# Patient Record
Sex: MALE | Race: WHITE | NOT HISPANIC OR LATINO | Employment: PART TIME | URBAN - METROPOLITAN AREA
[De-identification: names, ages, dates, MRNs, and addresses within clinical notes are randomized per-mention and may not be internally consistent; named-entity substitution may affect disease eponyms.]

---

## 2017-04-13 DIAGNOSIS — L40.50 PSORIATIC ARTHROPATHY (H): Primary | ICD-10-CM

## 2017-04-13 LAB
ERYTHROCYTE [DISTWIDTH] IN BLOOD BY AUTOMATED COUNT: 15.6 % (ref 10–15)
HCT VFR BLD AUTO: 41.4 % (ref 40–53)
HGB BLD-MCNC: 13.9 G/DL (ref 13.3–17.7)
MCH RBC QN AUTO: 29.7 PG (ref 26.5–33)
MCHC RBC AUTO-ENTMCNC: 33.6 G/DL (ref 31.5–36.5)
MCV RBC AUTO: 89 FL (ref 78–100)
PLATELET # BLD AUTO: 368 10E9/L (ref 150–450)
RBC # BLD AUTO: 4.68 10E12/L (ref 4.4–5.9)
WBC # BLD AUTO: 8.7 10E9/L (ref 4–11)

## 2017-04-13 PROCEDURE — 84450 TRANSFERASE (AST) (SGOT): CPT

## 2017-04-13 PROCEDURE — 84460 ALANINE AMINO (ALT) (SGPT): CPT

## 2017-04-13 PROCEDURE — 36415 COLL VENOUS BLD VENIPUNCTURE: CPT

## 2017-04-13 PROCEDURE — 82565 ASSAY OF CREATININE: CPT

## 2017-04-13 PROCEDURE — 85027 COMPLETE CBC AUTOMATED: CPT

## 2017-04-14 LAB
ALT SERPL W P-5'-P-CCNC: 24 U/L (ref 0–70)
AST SERPL W P-5'-P-CCNC: 9 U/L (ref 0–45)
CREAT SERPL-MCNC: 0.95 MG/DL (ref 0.66–1.25)
GFR SERPL CREATININE-BSD FRML MDRD: 81 ML/MIN/1.7M2

## 2017-06-30 DIAGNOSIS — T88.7XXA DRUG SIDE EFFECTS: Primary | ICD-10-CM

## 2017-06-30 LAB
ALT SERPL W P-5'-P-CCNC: 28 U/L (ref 0–70)
AST SERPL W P-5'-P-CCNC: 16 U/L (ref 0–45)
CREAT SERPL-MCNC: 0.78 MG/DL (ref 0.66–1.25)
GFR SERPL CREATININE-BSD FRML MDRD: NORMAL ML/MIN/1.7M2

## 2017-06-30 PROCEDURE — 82565 ASSAY OF CREATININE: CPT | Performed by: INTERNAL MEDICINE

## 2017-06-30 PROCEDURE — 84460 ALANINE AMINO (ALT) (SGPT): CPT | Performed by: INTERNAL MEDICINE

## 2017-06-30 PROCEDURE — 84450 TRANSFERASE (AST) (SGOT): CPT | Performed by: INTERNAL MEDICINE

## 2017-06-30 PROCEDURE — 36415 COLL VENOUS BLD VENIPUNCTURE: CPT | Performed by: INTERNAL MEDICINE

## 2017-07-07 DIAGNOSIS — T88.7XXA DRUG SIDE EFFECTS: ICD-10-CM

## 2017-07-07 LAB
ERYTHROCYTE [DISTWIDTH] IN BLOOD BY AUTOMATED COUNT: 13.9 % (ref 10–15)
HCT VFR BLD AUTO: 43.1 % (ref 40–53)
HGB BLD-MCNC: 14.5 G/DL (ref 13.3–17.7)
MCH RBC QN AUTO: 30.3 PG (ref 26.5–33)
MCHC RBC AUTO-ENTMCNC: 33.6 G/DL (ref 31.5–36.5)
MCV RBC AUTO: 90 FL (ref 78–100)
PLATELET # BLD AUTO: 297 10E9/L (ref 150–450)
RBC # BLD AUTO: 4.78 10E12/L (ref 4.4–5.9)
WBC # BLD AUTO: 6.5 10E9/L (ref 4–11)

## 2017-07-07 PROCEDURE — 85027 COMPLETE CBC AUTOMATED: CPT

## 2017-07-07 PROCEDURE — 36415 COLL VENOUS BLD VENIPUNCTURE: CPT

## 2017-12-01 DIAGNOSIS — T88.7XXA DRUG SIDE EFFECTS: ICD-10-CM

## 2017-12-01 LAB
ALT SERPL W P-5'-P-CCNC: 25 U/L (ref 0–70)
AST SERPL W P-5'-P-CCNC: 16 U/L (ref 0–45)
CREAT SERPL-MCNC: 0.84 MG/DL (ref 0.66–1.25)
ERYTHROCYTE [DISTWIDTH] IN BLOOD BY AUTOMATED COUNT: 13.4 % (ref 10–15)
GFR SERPL CREATININE-BSD FRML MDRD: >90 ML/MIN/1.7M2
HCT VFR BLD AUTO: 43.2 % (ref 40–53)
HGB BLD-MCNC: 14.2 G/DL (ref 13.3–17.7)
MCH RBC QN AUTO: 28.9 PG (ref 26.5–33)
MCHC RBC AUTO-ENTMCNC: 32.9 G/DL (ref 31.5–36.5)
MCV RBC AUTO: 88 FL (ref 78–100)
PLATELET # BLD AUTO: 330 10E9/L (ref 150–450)
RBC # BLD AUTO: 4.92 10E12/L (ref 4.4–5.9)
WBC # BLD AUTO: 7.3 10E9/L (ref 4–11)

## 2017-12-01 PROCEDURE — 85027 COMPLETE CBC AUTOMATED: CPT

## 2017-12-01 PROCEDURE — 84450 TRANSFERASE (AST) (SGOT): CPT

## 2017-12-01 PROCEDURE — 84460 ALANINE AMINO (ALT) (SGPT): CPT

## 2017-12-01 PROCEDURE — 82565 ASSAY OF CREATININE: CPT

## 2017-12-01 PROCEDURE — 36415 COLL VENOUS BLD VENIPUNCTURE: CPT

## 2018-02-01 ENCOUNTER — TRANSFERRED RECORDS (OUTPATIENT)
Dept: HEALTH INFORMATION MANAGEMENT | Facility: CLINIC | Age: 63
End: 2018-02-01

## 2018-02-01 LAB
ALT SERPL-CCNC: 25 IU/L (ref 5–40)
AST SERPL-CCNC: 19 U/L (ref 5–34)
CREAT SERPL-MCNC: 0.77 MG/DL (ref 0.5–1.3)
GFR SERPL CREATININE-BSD FRML MDRD: 109.5 ML/MIN/1.73M2

## 2018-02-05 ENCOUNTER — TELEPHONE (OUTPATIENT)
Dept: GENERAL RADIOLOGY | Facility: CLINIC | Age: 63
End: 2018-02-05

## 2018-02-05 DIAGNOSIS — R52 PAIN: ICD-10-CM

## 2018-02-05 PROCEDURE — 71046 X-RAY EXAM CHEST 2 VIEWS: CPT

## 2018-02-05 NOTE — TELEPHONE ENCOUNTER
2/5/2018- outside order (2 view Chest X-ray) from  Jewel Dickson D.O.  with [facility].    2/5/2018- Order complete

## 2018-09-04 ENCOUNTER — OFFICE VISIT (OUTPATIENT)
Dept: FAMILY MEDICINE | Facility: CLINIC | Age: 63
End: 2018-09-04
Payer: COMMERCIAL

## 2018-09-04 VITALS
DIASTOLIC BLOOD PRESSURE: 75 MMHG | TEMPERATURE: 97.9 F | BODY MASS INDEX: 28.49 KG/M2 | WEIGHT: 181.5 LBS | RESPIRATION RATE: 14 BRPM | OXYGEN SATURATION: 99 % | HEART RATE: 64 BPM | SYSTOLIC BLOOD PRESSURE: 115 MMHG | HEIGHT: 67 IN

## 2018-09-04 DIAGNOSIS — E78.5 HYPERLIPIDEMIA LDL GOAL <160: ICD-10-CM

## 2018-09-04 DIAGNOSIS — E66.811 CLASS 1 OBESITY DUE TO EXCESS CALORIES WITH SERIOUS COMORBIDITY AND BODY MASS INDEX (BMI) OF 31.0 TO 31.9 IN ADULT: ICD-10-CM

## 2018-09-04 DIAGNOSIS — L40.50 PSORIATIC ARTHRITIS (H): ICD-10-CM

## 2018-09-04 DIAGNOSIS — Z23 NEED FOR ZOSTER VACCINATION: ICD-10-CM

## 2018-09-04 DIAGNOSIS — Z00.00 ROUTINE GENERAL MEDICAL EXAMINATION AT A HEALTH CARE FACILITY: Primary | ICD-10-CM

## 2018-09-04 DIAGNOSIS — E66.01 MORBID OBESITY (H): ICD-10-CM

## 2018-09-04 DIAGNOSIS — Z12.5 SCREENING FOR PROSTATE CANCER: ICD-10-CM

## 2018-09-04 DIAGNOSIS — E66.09 CLASS 1 OBESITY DUE TO EXCESS CALORIES WITH SERIOUS COMORBIDITY AND BODY MASS INDEX (BMI) OF 31.0 TO 31.9 IN ADULT: ICD-10-CM

## 2018-09-04 DIAGNOSIS — L40.9 PSORIASIS: ICD-10-CM

## 2018-09-04 DIAGNOSIS — Z23 NEED FOR TDAP VACCINATION: ICD-10-CM

## 2018-09-04 PROCEDURE — 36415 COLL VENOUS BLD VENIPUNCTURE: CPT | Performed by: FAMILY MEDICINE

## 2018-09-04 PROCEDURE — G0103 PSA SCREENING: HCPCS | Performed by: FAMILY MEDICINE

## 2018-09-04 PROCEDURE — 99396 PREV VISIT EST AGE 40-64: CPT | Mod: 25 | Performed by: FAMILY MEDICINE

## 2018-09-04 PROCEDURE — 90715 TDAP VACCINE 7 YRS/> IM: CPT | Performed by: FAMILY MEDICINE

## 2018-09-04 PROCEDURE — 90750 HZV VACC RECOMBINANT IM: CPT | Performed by: FAMILY MEDICINE

## 2018-09-04 PROCEDURE — 80061 LIPID PANEL: CPT | Performed by: FAMILY MEDICINE

## 2018-09-04 PROCEDURE — 90471 IMMUNIZATION ADMIN: CPT | Performed by: FAMILY MEDICINE

## 2018-09-04 PROCEDURE — 90472 IMMUNIZATION ADMIN EACH ADD: CPT | Performed by: FAMILY MEDICINE

## 2018-09-04 PROCEDURE — 80053 COMPREHEN METABOLIC PANEL: CPT | Performed by: FAMILY MEDICINE

## 2018-09-04 RX ORDER — CALCITRIOL 3 UG/G
3 OINTMENT TOPICAL PRN
Qty: 45 G | Refills: 3 | Status: SHIPPED | OUTPATIENT
Start: 2018-09-04 | End: 2022-12-30

## 2018-09-04 RX ORDER — CLOBETASOL PROPIONATE 0.5 MG/ML
SOLUTION TOPICAL PRN
Qty: 50 ML | Refills: 3 | Status: SHIPPED | OUTPATIENT
Start: 2018-09-04 | End: 2022-12-30

## 2018-09-04 NOTE — MR AVS SNAPSHOT
After Visit Summary   9/4/2018    Artie Carpenter    MRN: [id number]           Patient Information     Date Of Birth          1955        Visit Information        Provider Department      9/4/2018 10:30 AM Sulaiman Delgado MD [facility]        Today's Diagnoses     Routine general medical examination at a health care facility    -  1    Hyperlipidemia LDL goal <160        Psoriatic arthritis (H)        Morbid obesity (H)        Class 1 obesity due to excess calories with serious comorbidity and body mass index (BMI) of 31.0 to 31.9 in adult        Screening for prostate cancer          Care Instructions      Preventive Health Recommendations  Male Ages 50 - 64    Yearly exam:             See your health care provider every year in order to  o   Review health changes.   o   Discuss preventive care.    o   Review your medicines if your doctor has prescribed any.     Have a cholesterol test every 5 years, or more frequently if you are at risk for high cholesterol/heart disease.     Have a diabetes test (fasting glucose) every three years. If you are at risk for diabetes, you should have this test more often.     Have a colonoscopy at age 50, or have a yearly FIT test (stool test). These exams will check for colon cancer.      Talk with your health care provider about whether or not a prostate cancer screening test (PSA) is right for you.    You should be tested each year for STDs (sexually transmitted diseases), if you re at risk.     Shots: Get a flu shot each year. Get a tetanus shot every 10 years.     Nutrition:    Eat at least 5 servings of fruits and vegetables daily.     Eat whole-grain bread, whole-wheat pasta and brown rice instead of white grains and rice.     Get adequate Calcium and Vitamin D.     Lifestyle    Exercise for at least 150 minutes a week (30 minutes a day, 5 days a week). This will help you control your weight and prevent disease.     Limit  "alcohol to one drink per day.     No smoking.     Wear sunscreen to prevent skin cancer.     See your dentist every six months for an exam and cleaning.     See your eye doctor every 1 to 2 years.            Follow-ups after your visit        Follow-up notes from your care team     Return in about 6 months (around 3/4/2019) for BP Recheck, Physical Exam, Lab Work.      Who to contact     If you have questions or need follow up information about today's clinic visit or your schedule please contact [place] [facility] directly at [phone].  Normal or non-critical lab and imaging results will be communicated to you by [employer], letter or phone within 4 business days after the clinic has received the results. If you do not hear from us within 7 days, please contact the clinic through [employer] or phone. If you have a critical or abnormal lab result, we will notify you by phone as soon as possible.  Submit refill requests through [employer] or call your pharmacy and they will forward the refill request to us. Please allow 3 business days for your refill to be completed.          Additional Information About Your Visit        MyChart Information     [employer] gives you secure access to your electronic health record. If you see a primary care provider, you can also send messages to your care team and make appointments. If you have questions, please call your primary care clinic.  If you do not have a primary care provider, please call [phone] and they will assist you.        Care EveryWhere ID     This is your Care EveryWhere ID. This could be used by other organizations to access your [facility] medical records  [id number]        Your Vitals Were     Pulse Temperature Respirations Height Pulse Oximetry BMI (Body Mass Index)    64 97.9  F (36.6  C) (Oral) 14 5' 6.5\" (1.689 m) 99% 28.86 kg/m2       Blood Pressure from Last 3 Encounters:   09/04/18 115/75   10/04/16 108/83   08/29/16 136/80    Weight from Last 3 " Encounters:   09/04/18 181 lb 8 oz (82.3 kg)   08/29/16 214 lb (97.1 kg)              We Performed the Following     Comprehensive metabolic panel     Lipid panel reflex to direct LDL Fasting     PSA, screen        Primary Care Provider Office Phone # Fax #    Sulaiman Delgado [phone] [address] [facility] [address]        Equal Access to Services     JUNIOR HERBERT : Hadii aad ku hadasho Soomaali, waaxda luqadaha, qaybta kaalmada adeegyada, waxay idiin hayaan adeeg kharash la'aan ah. So [facility] [phone].    ATENCIÓN: Si habla espman, tiene a nieto disposición servicios gratuitos de asistencia lingüística. Llame al [phone].    We comply with applicable federal civil rights laws and [place] laws. We do not discriminate on the basis of race, color, national origin, age, disability, sex, sexual orientation, or gender identity.            Thank you!     Thank you for choosing [facility]  for your care. Our goal is always to provide you with excellent care. Hearing back from our patients is one way we can continue to improve our services. Please take a few minutes to complete the written survey that you may receive in the mail after your visit with us. Thank you!             Your Updated Medication List - Protect others around you: Learn how to safely use, store and throw away your medicines at www.disposemymeds.org.          This list is accurate as of 9/4/18 10:59 AM.  Always use your most recent med list.                   Brand Name Dispense Instructions for use Diagnosis    * clobetasol 0.05 % ointment    TEMOVATE     Apply topically as needed        * clobetasol 0.05 % external solution    TEMOVATE     Apply topically as needed        VECTICAL 3 MCG/GM Oint   Generic drug:  Calcitriol      Externally apply 3 mcg topically as needed        * Notice:  This list has 2 medication(s) that are the same as other medications prescribed for you. Read the  directions carefully, and ask your doctor or other care provider to review them with you.

## 2018-09-04 NOTE — PROGRESS NOTES
SUBJECTIVE:   CC: Artie Carpenter is an 63 year old male who presents for preventative health visit.     Physical   Annual:     Getting at least 3 servings of Calcium per day:  Yes    Bi-annual eye exam:  NO    Dental care twice a year:  Yes    Sleep apnea or symptoms of sleep apnea:  None    Diet:  Regular (no restrictions)    Frequency of exercise:  2-3 days/week    Duration of exercise:  15-30 minutes    Taking medications regularly:  Yes    Medication side effects:  Not applicable    Additional concerns today:  No            Today's PHQ-2 Score:   PHQ-2 ( 1999 [employer]) 9/4/2018   Q1: Little interest or pleasure in doing things 0   Q2: Feeling down, depressed or hopeless 0   PHQ-2 Score 0   Q1: Little interest or pleasure in doing things Not at all   Q2: Feeling down, depressed or hopeless Not at all   PHQ-2 Score 0       Abuse: Current or Past(Physical, Sexual or Emotional)- No  Do you feel safe in your environment - Yes    Social History   Substance Use Topics     Smoking status: Never Smoker     Smokeless tobacco: Not on file     Alcohol use No     Alcohol Use 8/28/2018   If you drink alcohol do you typically have greater than 3 drinks per day OR greater than 7 drinks per week? Not Applicable       Last PSA: No results found for: PSA    Reviewed orders with patient. Reviewed health maintenance and updated orders accordingly -     He's no longer on immune   Reviewed and updated as needed this visit by clinical staff         Reviewed and updated as needed this visit by Provider            Review of Systems  CONSTITUTIONAL: NEGATIVE for fever, chills, change in weight  INTEGUMENTARY/SKIN: NEGATIVE for worrisome rashes, moles or lesions  EYES: NEGATIVE for vision changes or irritation  ENT: NEGATIVE for ear, mouth and throat problems  RESP: NEGATIVE for significant cough or SOB  CV: NEGATIVE for chest pain, palpitations or peripheral edema  GI: NEGATIVE for nausea, abdominal pain, heartburn, or change in bowel  habits   male: negative for dysuria, hematuria, decreased urinary stream, erectile dysfunction, urethral discharge  MUSCULOSKELETAL: NEGATIVE for significant arthralgias or myalgia  NEURO: NEGATIVE for weakness, dizziness or paresthesias  PSYCHIATRIC: NEGATIVE for changes in mood or affect    OBJECTIVE:   There were no vitals taken for this visit.    Physical Exam  GENERAL: healthy, alert and no distress  EYES: Eyes grossly normal to inspection, PERRL and conjunctivae and sclerae normal  HENT: ear canals and TM's normal, nose and mouth without ulcers or lesions  NECK: no adenopathy, no asymmetry, masses, or scars and thyroid normal to palpation  RESP: lungs clear to auscultation - no rales, rhonchi or wheezes  CV: regular rate and rhythm, normal S1 S2, no S3 or S4, no murmur, click or rub, no peripheral edema and peripheral pulses strong  ABDOMEN: soft, nontender, no hepatosplenomegaly, no masses and bowel sounds normal   (male): normal male genitalia without lesions or urethral discharge, no hernia  MS: no gross musculoskeletal defects noted, no edema  SKIN: no suspicious lesions or rashes  NEURO: Normal strength and tone, mentation intact and speech normal  PSYCH: mentation appears normal, affect normal/bright        ASSESSMENT/PLAN:     (Z00.00) Routine general medical examination at a health care facility  (primary encounter diagnosis)  Comment:   Plan: he's lost over 40 pounds with keto diet    (E78.5) Hyperlipidemia LDL goal <160  Comment:   Plan: Lipid panel reflex to direct LDL Fasting,         Comprehensive metabolic panel            (L40.50) Psoriatic arthritis (H)  Comment:   Plan: off his remicade    (E66.01) Morbid obesity (H)  Comment:   Plan: med review, diet check include josue     (E66.09,  Z68.31) Class 1 obesity due to excess calories with serious comorbidity and body mass index (BMI) of 31.0 to 31.9 in adult  Comment:   Plan:     (Z12.5) Screening for prostate cancer  Comment:   Plan: PSA,  "screen        Brother had prostate ca, his colon was checked two years ago       COUNSELING:   Reviewed preventive health counseling, as reflected in patient instructions       Regular exercise       Healthy diet/nutrition       Vision screening    BP Readings from Last 1 Encounters:   10/04/16 108/83     Estimated body mass index is 33.52 kg/(m^2) as calculated from the following:    Height as of 8/29/16: 5' 7\" (1.702 m).    Weight as of 8/29/16: 214 lb (97.1 kg).      Weight management plan: Discussed healthy diet and exercise guidelines and patient will follow up in 6 months in clinic to re-evaluate.     reports that he has never smoked. He does not have any smokeless tobacco history on file.  No alcohol    He has inflammatory arthritis     Counseling Resources:  ATP IV Guidelines  Pooled Cohorts Equation Calculator  FRAX Risk Assessment  ICSI Preventive Guidelines  Dietary Guidelines for Americans, 2010  USDA's MyPlate  ASA Prophylaxis  Lung CA Screening    Sulaiman Delgado MD  [facility]  Answers for HPI/ROS submitted by the patient on 8/28/2018   PHQ-2 Score: 0    "

## 2018-09-04 NOTE — LETTER
September 5, 2018      Artie Carpenter  [address] [address] [address]        Dear ,    We are writing to inform you of your test results. Blood is good including cholesterol which is at or better than the community average. We should check again in 6 months as you keep with the diet. / Prostate blood test is good also.        Resulted Orders   Lipid panel reflex to direct LDL Fasting   Result Value Ref Range    Cholesterol 207 (H) <200 mg/dL      Comment:      Desirable:       <200 mg/dl    Triglycerides 97 <150 mg/dL      Comment:      Fasting specimen    HDL Cholesterol 39 (L) >39 mg/dL    LDL Cholesterol Calculated 149 (H) <100 mg/dL      Comment:      Above desirable:  100-129 mg/dl  Borderline High:  130-159 mg/dL  High:             160-189 mg/dL  Very high:       >189 mg/dl      Non HDL Cholesterol 168 (H) <130 mg/dL      Comment:      Above Desirable:  130-159 mg/dl  Borderline high:  160-189 mg/dl  High:             190-219 mg/dl  Very high:       >219 mg/dl     Comprehensive metabolic panel   Result Value Ref Range    Sodium 140 133 - 144 mmol/L    Potassium 4.1 3.4 - 5.3 mmol/L    Chloride 107 94 - 109 mmol/L    Carbon Dioxide 25 20 - 32 mmol/L    Anion Gap 8 3 - 14 mmol/L    Glucose 95 70 - 99 mg/dL      Comment:      Fasting specimen    Urea Nitrogen 18 7 - 30 mg/dL    Creatinine 0.81 0.66 - 1.25 mg/dL    GFR Estimate >90 >60 mL/min/1.7m2      Comment:      Non  GFR Calc    GFR Estimate If Black >90 >60 mL/min/1.7m2      Comment:       GFR Calc    Calcium 8.9 8.5 - 10.1 mg/dL    Bilirubin Total 0.4 0.2 - 1.3 mg/dL    Albumin 3.8 3.4 - 5.0 g/dL    Protein Total 7.2 6.8 - 8.8 g/dL    Alkaline Phosphatase 85 40 - 150 U/L    ALT 23 0 - 70 U/L    AST 18 0 - 45 U/L   PSA, screen   Result Value Ref Range    PSA 0.34 0 - 4 ug/L      Comment:      Assay Method:  Chemiluminescence using Siemens Vista analyzer       If you have any questions or concerns,  please call the clinic at the number listed above.       Sincerely,        Sulaiman Delgado MD

## 2018-09-05 ENCOUNTER — MYC MEDICAL ADVICE (OUTPATIENT)
Dept: FAMILY MEDICINE | Facility: CLINIC | Age: 63
End: 2018-09-05

## 2018-09-05 DIAGNOSIS — L40.9 PSORIASIS: ICD-10-CM

## 2018-09-05 DIAGNOSIS — Z11.59 NEED FOR HEPATITIS C SCREENING TEST: ICD-10-CM

## 2018-09-05 DIAGNOSIS — N52.9 ERECTILE DYSFUNCTION, UNSPECIFIED ERECTILE DYSFUNCTION TYPE: Primary | ICD-10-CM

## 2018-09-05 LAB
ALBUMIN SERPL-MCNC: 3.8 G/DL (ref 3.4–5)
ALP SERPL-CCNC: 85 U/L (ref 40–150)
ALT SERPL W P-5'-P-CCNC: 23 U/L (ref 0–70)
ANION GAP SERPL CALCULATED.3IONS-SCNC: 8 MMOL/L (ref 3–14)
AST SERPL W P-5'-P-CCNC: 18 U/L (ref 0–45)
BILIRUB SERPL-MCNC: 0.4 MG/DL (ref 0.2–1.3)
BUN SERPL-MCNC: 18 MG/DL (ref 7–30)
CALCIUM SERPL-MCNC: 8.9 MG/DL (ref 8.5–10.1)
CHLORIDE SERPL-SCNC: 107 MMOL/L (ref 94–109)
CHOLEST SERPL-MCNC: 207 MG/DL
CO2 SERPL-SCNC: 25 MMOL/L (ref 20–32)
CREAT SERPL-MCNC: 0.81 MG/DL (ref 0.66–1.25)
GFR SERPL CREATININE-BSD FRML MDRD: >90 ML/MIN/1.7M2
GLUCOSE SERPL-MCNC: 95 MG/DL (ref 70–99)
HDLC SERPL-MCNC: 39 MG/DL
LDLC SERPL CALC-MCNC: 149 MG/DL
NONHDLC SERPL-MCNC: 168 MG/DL
POTASSIUM SERPL-SCNC: 4.1 MMOL/L (ref 3.4–5.3)
PROT SERPL-MCNC: 7.2 G/DL (ref 6.8–8.8)
PSA SERPL-ACNC: 0.34 UG/L (ref 0–4)
SODIUM SERPL-SCNC: 140 MMOL/L (ref 133–144)
TRIGL SERPL-MCNC: 97 MG/DL

## 2018-09-08 RX ORDER — SILDENAFIL CITRATE 20 MG/1
TABLET ORAL
Qty: 30 TABLET | Refills: 0 | Status: SHIPPED | OUTPATIENT
Start: 2018-09-08 | End: 2022-12-30

## 2018-09-08 NOTE — TELEPHONE ENCOUNTER
Both ointment and soln were sent on 9/4     For the viagra I was waiting on the labs.. Remember that insurance often will not pay for it.     I usually order the 20 mg sildenafil and instruct to use 1 or 2 or 3 before sex. It is still generally much cheaper than the 50 mg sildenafil (viagra) See what you think.

## 2018-09-10 NOTE — TELEPHONE ENCOUNTER
Sent [employer] message, lab confirms will need redraw for hep C, futured order, will monitor pt response  Guadalupe Rabago RN, BSN  Message handled by Nurse Triage.

## 2018-09-10 NOTE — TELEPHONE ENCOUNTER
Discussed with lab again, 72 hr turn around so sample no longer useful, will confirm rx, sent another "[employer]t message    Guadalupe Rabago RN, BSN  Message handled by Nurse Triage.

## 2018-09-11 RX ORDER — CLOBETASOL PROPIONATE 0.5 MG/G
OINTMENT TOPICAL
Qty: 45 G | Refills: 3 | Status: SHIPPED | OUTPATIENT
Start: 2018-09-11 | End: 2022-12-30

## 2018-09-11 NOTE — TELEPHONE ENCOUNTER
Sulaiman Delgado MD, pt asking for third psoriasis medication, see Mychart message, will need to say HOW MUCH APPLY AT A TIME,  Inform pt if problem    I apply it about twice a week, I alternate it with the Calcitriol.  I have patches on my forearms, lower and upper legs and my back.    Guadalupe Rabago RN, BSN  Message handled by Nurse Triage.

## 2018-10-29 ENCOUNTER — ALLIED HEALTH/NURSE VISIT (OUTPATIENT)
Dept: NURSING | Facility: CLINIC | Age: 63
End: 2018-10-29
Payer: COMMERCIAL

## 2018-10-29 DIAGNOSIS — Z11.1 SCREENING EXAMINATION FOR PULMONARY TUBERCULOSIS: Primary | ICD-10-CM

## 2018-10-29 PROCEDURE — 99207 ZZC NO CHARGE NURSE ONLY: CPT

## 2018-10-29 PROCEDURE — 86580 TB INTRADERMAL TEST: CPT

## 2018-10-29 NOTE — MR AVS SNAPSHOT
After Visit Summary   10/29/2018    Artie Carpenter    MRN: [id number]           Patient Information     Date Of Birth          1955        Visit Information        Provider Department      10/29/2018 11:00 AM KRIS BLACKMON/LPN [facility]        Today's Diagnoses     Screening examination for pulmonary tuberculosis    -  1       Follow-ups after your visit        Who to contact     If you have questions or need follow up information about today's clinic visit or your schedule please contact [facility] directly at [phone].  Normal or non-critical lab and imaging results will be communicated to you by [employer], letter or phone within 4 business days after the clinic has received the results. If you do not hear from us within 7 days, please contact the clinic through [employer] or phone. If you have a critical or abnormal lab result, we will notify you by phone as soon as possible.  Submit refill requests through [employer] or call your pharmacy and they will forward the refill request to us. Please allow 3 business days for your refill to be completed.          Additional Information About Your Visit        MyChart Information     [employer] gives you secure access to your electronic health record. If you see a primary care provider, you can also send messages to your care team and make appointments. If you have questions, please call your primary care clinic.  If you do not have a primary care provider, please call [phone] and they will assist you.        Care EveryWhere ID     This is your Care EveryWhere ID. This could be used by other organizations to access your [place] [facility] medical records  [id number]         Blood Pressure from Last 3 Encounters:   09/04/18 115/75   10/04/16 108/83   08/29/16 136/80    Weight from Last 3 Encounters:   09/04/18 181 lb 8 oz (82.3 kg)   08/29/16 214 lb (97.1 kg)              We Performed the Following     TB INTRADERMAL TEST         Primary Care Provider Office Phone # Fax #    Sulaiman Delgado [phone] [address] [facility] [address]        Equal Access to Services     ANETTE LAYALIZA : Hadii cirilo barillas chinyereo Bing, waaxda luqadaha, qaybta kaalmada adejohn, jose j greenmikayla susana. So [facility] [phone].    ATENCIÓN: Si habla español, tiene a nieto disposición servicios gratuitos de asistencia lingüística. Llame al [phone].    We comply with applicable federal civil rights laws and [place] laws. We do not discriminate on the basis of race, color, national origin, age, disability, sex, sexual orientation, or gender identity.            Thank you!     Thank you for choosing [facility]  for your care. Our goal is always to provide you with excellent care. Hearing back from our patients is one way we can continue to improve our services. Please take a few minutes to complete the written survey that you may receive in the mail after your visit with us. Thank you!             Your Updated Medication List - Protect others around you: Learn how to safely use, store and throw away your medicines at www.disposemymeds.org.          This list is accurate as of 10/29/18 11:04 AM.  Always use your most recent med list.                   Brand Name Dispense Instructions for use Diagnosis    Calcitriol 3 MCG/GM Oint    VECTICAL    45 g    Externally apply 3 mcg topically as needed    Psoriasis       * clobetasol 0.05 % external solution    TEMOVATE    50 mL    Apply topically as needed    Psoriasis       * clobetasol 0.05 % ointment    TEMOVATE    45 g    Apply to affected skin   twice weekly prn    Psoriasis       sildenafil 20 MG tablet    REVATIO    30 tablet    Take 1 tablet to 3 tablets one hour before sex,    Erectile dysfunction, unspecified erectile dysfunction type       * Notice:  This list has 2 medication(s) that are the same as other medications prescribed for you.  Read the directions carefully, and ask your doctor or other care provider to review them with you.

## 2018-11-21 ENCOUNTER — ALLIED HEALTH/NURSE VISIT (OUTPATIENT)
Dept: NURSING | Facility: CLINIC | Age: 63
End: 2018-11-21
Payer: COMMERCIAL

## 2018-11-21 DIAGNOSIS — Z23 ENCOUNTER FOR IMMUNIZATION: Primary | ICD-10-CM

## 2018-11-21 PROCEDURE — 99207 ZZC NO CHARGE NURSE ONLY: CPT

## 2018-11-21 PROCEDURE — 90750 HZV VACC RECOMBINANT IM: CPT

## 2018-11-21 PROCEDURE — 90471 IMMUNIZATION ADMIN: CPT

## 2019-06-18 ENCOUNTER — MYC MEDICAL ADVICE (OUTPATIENT)
Dept: FAMILY MEDICINE | Facility: CLINIC | Age: 64
End: 2019-06-18

## 2019-06-18 NOTE — TELEPHONE ENCOUNTER
Discussed with staff, can make nurse only weight/height check, system should update, can confirm problem list after, sent [email or web address] message informing  Guadalupe Rabago RN, BSN  Message handled by Nurse Triage.

## 2019-06-18 NOTE — TELEPHONE ENCOUNTER
Good work for Artie, I don't know how to take citations off the problem list in our new Epic , can someone find out how this is done or even find the problem list tab. Thanks. .

## 2019-06-20 ENCOUNTER — OFFICE VISIT (OUTPATIENT)
Dept: ORTHOPEDICS | Facility: CLINIC | Age: 64
End: 2019-06-20
Payer: COMMERCIAL

## 2019-06-20 VITALS
HEIGHT: 67 IN | SYSTOLIC BLOOD PRESSURE: 112 MMHG | BODY MASS INDEX: 28.41 KG/M2 | WEIGHT: 181 LBS | DIASTOLIC BLOOD PRESSURE: 78 MMHG

## 2019-06-20 DIAGNOSIS — M53.3 SACROILIAC JOINT DYSFUNCTION OF LEFT SIDE: ICD-10-CM

## 2019-06-20 DIAGNOSIS — M54.32 SCIATICA OF LEFT SIDE: Primary | ICD-10-CM

## 2019-06-20 PROCEDURE — 99204 OFFICE O/P NEW MOD 45 MIN: CPT | Performed by: FAMILY MEDICINE

## 2019-06-20 ASSESSMENT — MIFFLIN-ST. JEOR: SCORE: 1571.7

## 2019-06-20 NOTE — PATIENT INSTRUCTIONS
1. Sciatica of left side    2. Sacroiliac joint dysfunction of left side      -Patient has low back pain rating down the left leg likely due to combination of degenerative disc disease and sacroiliac joint dysfunction.  -Patient has had mild temporary relief with chiropractic treatments.  -Patient will get an MRI of the lumbar spine for further evaluation.  Your MRI has been ordered. Will check for same day otherwise, schedule with [place] ([phone]). Once you know the date of your MRI, please call my office and schedule a follow-up visit for 2 days after that.  -Call direct clinic number [[phone]] at any time with questions or concerns.    Albert Yeo MD CAQSM  [facility]  [facility] [place]

## 2019-06-20 NOTE — LETTER
6/20/2019         RE: Artie Carpenter  [address]  [facility] [address]        Dear Colleague,    Thank you for referring your patient, Artie Carpenter, to the [facility]. Please see a copy of my visit note below.    ASSESSMENT & PLAN  Patient Instructions     1. Sciatica of left side    2. Sacroiliac joint dysfunction of left side      -Patient has low back pain rating down the left leg likely due to combination of degenerative disc disease and sacroiliac joint dysfunction.  -Patient has had mild temporary relief with chiropractic treatments.  -Patient will get an MRI of the lumbar spine for further evaluation.  Your MRI has been ordered. Will check for same day otherwise, schedule with [facility] ([phone]). Once you know the date of your MRI, please call my office and schedule a follow-up visit for 2 days after that.  -Call direct clinic number [[phone]] at any time with questions or concerns.    Albert Yeo MD [facility]  [facility]          -----    SUBJECTIVE  Artie Carpenter is a/an 64 year old male who is seen in consultation at the request of  Sulaiman Delgado M.D. for evaluation of low back pain. The patient is seen by themselves.    Onset: 2 years(s) ago. Reports insidious onset without acute precipitating event.  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  Better with: heat, medication, chiropractor provide, temporary relief  Treatments tried: rest/activity avoidance, heat, Tylenol, ibuprofen, home exercises, chiropractic care (6-7 visits), previous imaging (xray March 2019 @ [facility]) and yoga  Quality: aching, radiating to left calf  Red flags: Weakness: No, bowel/bladder  loss: No, foot drop: No  Associated symptoms: no distal numbness or tingling; denies swelling or warmth  Orthopedic history: YES - psoriatic arthritis  Relevant surgical history: NO  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    Past Medical History:   Diagnosis Date     Arthritis      Exogenous obesity      Psoriasis      Psoriatic arthritis (H)      Social History     Socioeconomic History     Marital status:      Spouse name: Not on file     Number of children: Not on file     Years of education: Not on file     Highest education level: Not on file   Occupational History     Not on file   Social Needs     Financial resource strain: Not on file     Food insecurity:     Worry: Not on file     Inability: Not on file     Transportation needs:     Medical: Not on file     Non-medical: Not on file   Tobacco Use     Smoking status: Never Smoker     Smokeless tobacco: Never Used   Substance and Sexual Activity     Alcohol use: No     Alcohol/week: 0.0 oz     Drug use: No     Sexual activity: Not on file   Lifestyle     Physical activity:     Days per week: Not on file     Minutes per session: Not on file     Stress: Not on file   Relationships     Social connections:     Talks on phone: Not on file     Gets together: Not on file     Attends Shinto service: Not on file     Active member of club or organization: Not on file     Attends meetings of clubs or organizations: Not on file     Relationship status: Not on file     Intimate partner violence:     Fear of current or ex partner: Not on file     Emotionally abused: Not on file     Physically abused: Not on file     Forced sexual activity: Not on file   Other Topics Concern     Parent/sibling w/ CABG, MI or angioplasty before 65F 55M? Not Asked   Social History Narrative     Not on file         Patient's past medical, surgical, social, and family histories were reviewed today and no changes are noted.    REVIEW OF  "SYSTEMS:  10 point ROS is negative other than symptoms noted above in HPI, Past Medical History or as stated below  Constitutional: NEGATIVE for fever, chills, change in weight  Skin: NEGATIVE for worrisome rashes, moles or lesions  GI/: NEGATIVE for bowel or bladder changes  Neuro: NEGATIVE for weakness, dizziness or paresthesias    OBJECTIVE:  /78   Ht 1.689 m (5' 6.5\")   Wt 82.1 kg (181 lb)   BMI 28.78 kg/m      General: healthy, alert and in no distress  HEENT: no scleral icterus or conjunctival erythema  Skin: no suspicious lesions or rash. No jaundice.  CV: no pedal edema  Resp: normal respiratory effort without conversational dyspnea   Psych: normal mood and affect  Gait: normal steady gait with appropriate coordination and balance  Neuro: normal light touch sensory exam of the bilateral lower extremities.  DTR's 2+ patella and achilles bilaterally.  MSK:  THORACIC/LUMBAR SPINE  Inspection:    No gross deformity/asymmetry  Palpation:     landmarks are nontender.  Range of Motion:     Lumbar flexion limited slightly by pain    Lumbar extension limited slightly by pain  Strength:    Full strength throughout hips/quads/hamstrings  Special Tests:    Positive: SI joint compression (left), Gaenslen's test    Negative: straight leg raise (bilateral), slump test (bilateral), femoral stretch test (bilateral)    LEFT HIP  Inspection:    No obvious deformity or asymmetry, pelvis level  Palpation:    Tender about the SI joint. Otherwise all other landmarks are nontender.  Active Range of Motion:     Flexion within normal limits, IR within normal limits, ER  within normal limits  Strength:    Flexion 5/5, adduction 5/5, abduction 5/5  Special Tests:    Positive: BRIANA SI provocative testing        Independent visualization of the below image:  No results found for this or any previous visit (from the past 24 hour(s)).          Albert Yeo MD [facility]      Again, thank you for " allowing me to participate in the care of your patient.        Sincerely,        Albert Yeo, MD

## 2019-06-20 NOTE — PROGRESS NOTES
ASSESSMENT & PLAN  Patient Instructions     1. Sciatica of left side    2. Sacroiliac joint dysfunction of left side      -Patient has low back pain rating down the left leg likely due to combination of degenerative disc disease and sacroiliac joint dysfunction.  -Patient has had mild temporary relief with chiropractic treatments.  -Patient will get an MRI of the lumbar spine for further evaluation.  Your MRI has been ordered. Will check for same day otherwise, schedule with [facility] ([phone]). Once you know the date of your MRI, please call my office and schedule a follow-up visit for 2 days after that.  -Call direct clinic number [[phone]] at any time with questions or concerns.    Albert Yeo MD [facility]  [facility]          -----    SUBJECTIVE  Artie Carpenter is a/an 64 year old male who is seen in consultation at the request of  Sulaiman Delgado M.D. for evaluation of low back pain. The patient is seen by themselves.    Onset: 2 years(s) ago. Reports insidious onset without acute precipitating event.  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  Better with: heat, medication, chiropractor provide, temporary relief  Treatments tried: rest/activity avoidance, heat, Tylenol, ibuprofen, home exercises, chiropractic care (6-7 visits), previous imaging (xray March 2019 @ [facility]) and yoga  Quality: aching, radiating to left calf  Red flags: Weakness: No, bowel/bladder loss: No, foot drop: No  Associated symptoms: no distal numbness or tingling; denies swelling or warmth  Orthopedic history: YES - psoriatic arthritis  Relevant surgical history: NO  Social history: social history: 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    Past Medical History:   Diagnosis Date     Arthritis      Exogenous obesity      Psoriasis      Psoriatic arthritis (H)      Social History     Socioeconomic History     Marital status:      Spouse name: Not on file     Number of children: Not on file     Years of education: Not on file     Highest education level: Not on file   Occupational History     Not on file   Social Needs     Financial resource strain: Not on file     Food insecurity:     Worry: Not on file     Inability: Not on file     Transportation needs:     Medical: Not on file     Non-medical: Not on file   Tobacco Use     Smoking status: Never Smoker     Smokeless tobacco: Never Used   Substance and Sexual Activity     Alcohol use: No     Alcohol/week: 0.0 oz     Drug use: No     Sexual activity: Not on file   Lifestyle     Physical activity:     Days per week: Not on file     Minutes per session: Not on file     Stress: Not on file   Relationships     Social connections:     Talks on phone: Not on file     Gets together: Not on file     Attends [employer] service: Not on file     Active member of club or organization: Not on file     Attends meetings of clubs or organizations: Not on file     Relationship status: Not on file     Intimate partner violence:     Fear of current or ex partner: Not on file     Emotionally abused: Not on file     Physically abused: Not on file     Forced sexual activity: Not on file   Other Topics Concern     Parent/sibling w/ CABG, MI or angioplasty before 65F 55M? Not Asked   Social History Narrative     Not on file         Patient's past medical, surgical, social, and family histories were reviewed today and no changes are noted.    REVIEW OF SYSTEMS:  10 point ROS is negative other than symptoms noted above in HPI, Past Medical History or as stated below  Constitutional: NEGATIVE for fever, chills, change in weight  Skin: NEGATIVE for worrisome rashes, moles or lesions  GI/: NEGATIVE for bowel  "or bladder changes  Neuro: NEGATIVE for weakness, dizziness or paresthesias    OBJECTIVE:  /78   Ht 1.689 m (5' 6.5\")   Wt 82.1 kg (181 lb)   BMI 28.78 kg/m     General: healthy, alert and in no distress  HEENT: no scleral icterus or conjunctival erythema  Skin: no suspicious lesions or rash. No jaundice.  CV: no pedal edema  Resp: normal respiratory effort without conversational dyspnea   Psych: normal mood and affect  Gait: normal steady gait with appropriate coordination and balance  Neuro: normal light touch sensory exam of the bilateral lower extremities.  DTR's 2+ patella and achilles bilaterally.  MSK:  THORACIC/LUMBAR SPINE  Inspection:    No gross deformity/asymmetry  Palpation:     landmarks are nontender.  Range of Motion:     Lumbar flexion limited slightly by pain    Lumbar extension limited slightly by pain  Strength:    Full strength throughout hips/quads/hamstrings  Special Tests:    Positive: SI joint compression (left), Gaenslen's test    Negative: straight leg raise (bilateral), slump test (bilateral), femoral stretch test (bilateral)    LEFT HIP  Inspection:    No obvious deformity or asymmetry, pelvis level  Palpation:    Tender about the SI joint. Otherwise all other landmarks are nontender.  Active Range of Motion:     Flexion within normal limits, IR within normal limits, ER  within normal limits  Strength:    Flexion 5/5, adduction 5/5, abduction 5/5  Special Tests:    Positive: NICHO WARREN provocative testing        Independent visualization of the below image:  No results found for this or any previous visit (from the past 24 hour(s)).          Albert Yeo MD [facility]    "

## 2019-06-24 ENCOUNTER — HOSPITAL ENCOUNTER (OUTPATIENT)
Dept: MRI IMAGING | Facility: CLINIC | Age: 64
Discharge: HOME | End: 2019-06-24
Attending: FAMILY MEDICINE | Admitting: FAMILY MEDICINE
Payer: COMMERCIAL

## 2019-06-24 DIAGNOSIS — M54.32 SCIATICA OF LEFT SIDE: ICD-10-CM

## 2019-06-24 DIAGNOSIS — M53.3 SACROILIAC JOINT DYSFUNCTION OF LEFT SIDE: ICD-10-CM

## 2019-06-24 PROCEDURE — 72148 MRI LUMBAR SPINE W/O DYE: CPT

## 2019-06-27 ENCOUNTER — OFFICE VISIT (OUTPATIENT)
Dept: ORTHOPEDICS | Facility: CLINIC | Age: 64
End: 2019-06-27
Payer: COMMERCIAL

## 2019-06-27 VITALS
WEIGHT: 159 LBS | HEIGHT: 67 IN | DIASTOLIC BLOOD PRESSURE: 70 MMHG | BODY MASS INDEX: 24.96 KG/M2 | SYSTOLIC BLOOD PRESSURE: 116 MMHG

## 2019-06-27 DIAGNOSIS — M43.00 PARS DEFECT WITHOUT SPONDYLOLISTHESIS: ICD-10-CM

## 2019-06-27 DIAGNOSIS — M51.369 LUMBAR DEGENERATIVE DISC DISEASE: Primary | ICD-10-CM

## 2019-06-27 DIAGNOSIS — M47.816 LUMBAR FACET ARTHROPATHY: ICD-10-CM

## 2019-06-27 PROCEDURE — 99214 OFFICE O/P EST MOD 30 MIN: CPT | Performed by: FAMILY MEDICINE

## 2019-06-27 RX ORDER — CYCLOBENZAPRINE HCL 10 MG
10 TABLET ORAL
Qty: 30 TABLET | Refills: 0 | Status: SHIPPED | OUTPATIENT
Start: 2019-06-27 | End: 2022-12-30

## 2019-06-27 ASSESSMENT — MIFFLIN-ST. JEOR: SCORE: 1479.85

## 2019-06-27 NOTE — PROGRESS NOTES
"ASSESSMENT & PLAN  Patient Instructions     1. Lumbar degenerative disc disease    2. Lumbar facet arthropathy    3. Pars defect without spondylolisthesis      -Patient is here for follow-up of low back pain due to arthritis, degenerative disc disease, facet arthropathy and bilateral pars defects  -MRI of lumbar spine was reviewed today.  All questions answered.  -Patient will start formal physical therapy with Samantha Martinez and home exercise program.  -Patient will continue with Tylenol and ibuprofen as needed.  Patient will start Flexeril at night as needed.  Prescription was given today.  -The patient was given the option for a lumbar epidural steroid injection which he is not interested at this time.  -Patient will follow-up in 4 to 6 weeks for reevaluation and progression of activity.  -Call direct clinic number [[phone]] at any time with questions or concerns.    Albert Yeo MD [facility] Orth[facility] and Sports Medicine  [facility]          -----    SUBJECTIVE:  Artie Carpenter is a 64 year old male who is seen in follow-up for MRI results of lumbar spine.They were last seen 6/20/2019.     Since their last visit reports 0% - (About the same as last time). They indicate that their current pain level is 4/10. They have tried rest/activity avoidance, Tylenol, ibuprofen and previous imaging (MRI 6/24/19).      The patient is seen by themselves.    Patient's past medical, surgical, social, and family histories were reviewed today and no changes are noted.    REVIEW OF SYSTEMS:  Constitutional: NEGATIVE for fever, chills, change in weight  Skin: NEGATIVE for worrisome rashes, moles or lesions  GI/: NEGATIVE for bowel or bladder changes  Neuro: NEGATIVE for weakness, dizziness or paresthesias    OBJECTIVE:  /70   Ht 1.702 m (5' 7\")   Wt 72.1 kg (159 lb)   BMI 24.90 kg/m     General: healthy, alert and in no distress  HEENT: no scleral icterus or conjunctival erythema  Skin: no " suspicious lesions or rash. No jaundice.  CV: regular rhythm by palpation, no pedal edema  Resp: normal respiratory effort without conversational dyspnea   Psych: normal mood and affect  Gait: normal steady gait with appropriate coordination and balance  Neuro: normal light touch sensory exam of the extremities.    MSK:  THORACIC/LUMBAR SPINE  Inspection:    No gross deformity/asymmetry  Palpation:     landmarks are nontender.  Range of Motion:     Lumbar flexion limited slightly by pain    Lumbar extension limited slightly by pain  Strength:    Full strength throughout hips/quads/hamstrings  Special Tests:    Positive: SI joint compression (left), Gaenslen's test    Negative: straight leg raise (bilateral), slump test (bilateral), femoral stretch test (bilateral)     LEFT HIP  Inspection:    No obvious deformity or asymmetry, pelvis level  Palpation:    Tender about the SI joint. Otherwise all other landmarks are nontender.  Active Range of Motion:     Flexion within normal limits, IR within normal limits, ER  within normal limits  Strength:    Flexion 5/5, adduction 5/5, abduction 5/5  Special Tests:    Positive: BRIANA SI provocative testing    Independent visualization of the below image:  Results for orders placed or performed during the hospital encounter of 06/24/19   MR Lumbar Spine w/o Contrast    Narrative    MRI OF THE LUMBAR SPINE WITHOUT CONTRAST 6/24/2019 3:15 PM     COMPARISON: None    HISTORY: Radiculopathy, greater than 6 weeks conservative treatment,  persistent symptoms; 2 years of low back pain worsening recently, no  acute injury, sciatica down left leg.  Rule out lumbar HNP. Sciatica  of left side. Sacroiliac joint dysfunction of left side.    TECHNIQUE: Multiplanar, multisequence MRI images of the lumbar spine  were acquired without IV contrast.    FINDINGS: There are five lumbar-type vertebrae for the purposes of  this dictation.     There is minimal degenerative anterolisthesis of L5 upon  S1. There are  also chronic-appearing bilateral L5 pars interarticularis defects.  Alignment of the lumbar vertebrae is otherwise normal. Vertebral body  heights of the lumbar spine are normal. Marrow signal throughout the  lumbar vertebrae is normal. There is no evidence for fracture or  pathologic bony lesion of the lumbar spine.    There is minimal posterior disc bulging at the L5-S1 level. No other  significant posterior disc bulges or herniations are noted in the  lumbar spine.    The tip of the conus medullaris is at the mid L2 level which is within  normal limits. There is no evidence for intrathecal abnormality.     Level by level:     L1-L2: There is no spinal canal or neural foraminal stenosis at this  level.    L2-L3: There is mild facet arthropathy bilaterally. There is no spinal  canal or neural foraminal stenosis at this level.    L3-L4: There is mild facet arthropathy bilaterally. There is no spinal  canal or neural foraminal stenosis at this level.    L4-L5: There is mild facet arthropathy bilaterally and a minimal  circumferential disc bulge. These findings result in minimal bilateral  neural foraminal narrowing but no spinal canal stenosis.    L5-S1: There is severe facet arthropathy bilaterally and a  circumferential disc bulge. These findings result in moderate left  foraminal stenosis, mild right foraminal narrowing and minimal spinal  canal narrowing.      Impression    IMPRESSION: Degenerative changes of the lumbar spine as described  above. Chronic-appearing bilateral L5 pars interarticularis defects.    GUERRERO RIOS MD         Patient's conditions were thoroughly discussed during today's visit with greater than 50% of the visit spent counseling the patient with total time spent face-to-face with the patient being 25 minutes.    Albert Yeo MD, [facility] and Orthopedic Care

## 2019-06-27 NOTE — PATIENT INSTRUCTIONS
1. Lumbar degenerative disc disease    2. Lumbar facet arthropathy    3. Pars defect without spondylolisthesis      -Patient is here for follow-up of low back pain due to arthritis, degenerative disc disease, facet arthropathy and bilateral pars defects  -MRI of lumbar spine was reviewed today.  All questions answered.  -Patient will start formal physical therapy with Samantha Martinez and home exercise program.  -Patient will continue with Tylenol and ibuprofen as needed.  Patient will start Flexeril at night as needed.  Prescription was given today.  -The patient was given the option for a lumbar epidural steroid injection which he is not interested at this time.  -Patient will follow-up in 4 to 6 weeks for reevaluation and progression of activity.  -Call direct clinic number [[phone]] at any time with questions or concerns.    Albert Yeo MD [facility]  [facility]

## 2019-06-27 NOTE — LETTER
6/27/2019         RE: Artie Carpenter  [address] [address]  [facility] [address]        Dear Colleague,    Thank you for referring your patient, Artie Carpenter, to the [facility]. Please see a copy of my visit note below.    ASSESSMENT & PLAN  Patient Instructions     1. Lumbar degenerative disc disease    2. Lumbar facet arthropathy    3. Pars defect without spondylolisthesis      -Patient is here for follow-up of low back pain due to arthritis, degenerative disc disease, facet arthropathy and bilateral pars defects  -MRI of lumbar spine was reviewed today.  All questions answered.  -Patient will start formal physical therapy with Samantha Martinez and home exercise program.  -Patient will continue with Tylenol and ibuprofen as needed.  Patient will start Flexeril at night as needed.  Prescription was given today.  -The patient was given the option for a lumbar epidural steroid injection which he is not interested at this time.  -Patient will follow-up in 4 to 6 weeks for reevaluation and progression of activity.  -Call direct clinic number [[phone]] at any time with questions or concerns.    Albert Yeo MD [facility]  [facility]          -----    SUBJECTIVE:  Artie Carpenter is a 64 year old male who is seen in follow-up for MRI results of lumbar spine.They were last seen 6/20/2019.     Since their last visit reports 0% - (About the same as last time). They indicate that their current pain level is 4/10. They have tried rest/activity avoidance, Tylenol, ibuprofen and previous imaging (MRI 6/24/19).      The patient is seen by themselves.    Patient's past medical, surgical, social, and family histories were reviewed today and no changes are noted.    REVIEW OF SYSTEMS:  Constitutional: NEGATIVE for fever, chills, change in weight  Skin: NEGATIVE for worrisome rashes, moles or lesions  GI/: NEGATIVE for bowel or bladder  "changes  Neuro: NEGATIVE for weakness, dizziness or paresthesias    OBJECTIVE:  /70   Ht 1.702 m (5' 7\")   Wt 72.1 kg (159 lb)   BMI 24.90 kg/m      General: healthy, alert and in no distress  HEENT: no scleral icterus or conjunctival erythema  Skin: no suspicious lesions or rash. No jaundice.  CV: regular rhythm by palpation, no pedal edema  Resp: normal respiratory effort without conversational dyspnea   Psych: normal mood and affect  Gait: normal steady gait with appropriate coordination and balance  Neuro: normal light touch sensory exam of the extremities.    MSK:  THORACIC/LUMBAR SPINE  Inspection:    No gross deformity/asymmetry  Palpation:     landmarks are nontender.  Range of Motion:     Lumbar flexion limited slightly by pain    Lumbar extension limited slightly by pain  Strength:    Full strength throughout hips/quads/hamstrings  Special Tests:    Positive: SI joint compression (left), Gaenslen's test    Negative: straight leg raise (bilateral), slump test (bilateral), femoral stretch test (bilateral)     LEFT HIP  Inspection:    No obvious deformity or asymmetry, pelvis level  Palpation:    Tender about the SI joint. Otherwise all other landmarks are nontender.  Active Range of Motion:     Flexion within normal limits, IR within normal limits, ER  within normal limits  Strength:    Flexion 5/5, adduction 5/5, abduction 5/5  Special Tests:    Positive: BRIANA SI provocative testing    Independent visualization of the below image:  Results for orders placed or performed during the hospital encounter of 06/24/19   MR Lumbar Spine w/o Contrast    Narrative    MRI OF THE LUMBAR SPINE WITHOUT CONTRAST 6/24/2019 3:15 PM     COMPARISON: None    HISTORY: Radiculopathy, greater than 6 weeks conservative treatment,  persistent symptoms; 2 years of low back pain worsening recently, no  acute injury, sciatica down left leg.  Rule out lumbar HNP. Sciatica  of left side. Sacroiliac joint dysfunction of left " side.    TECHNIQUE: Multiplanar, multisequence MRI images of the lumbar spine  were acquired without IV contrast.    FINDINGS: There are five lumbar-type vertebrae for the purposes of  this dictation.     There is minimal degenerative anterolisthesis of L5 upon S1. There are  also chronic-appearing bilateral L5 pars interarticularis defects.  Alignment of the lumbar vertebrae is otherwise normal. Vertebral body  heights of the lumbar spine are normal. Marrow signal throughout the  lumbar vertebrae is normal. There is no evidence for fracture or  pathologic bony lesion of the lumbar spine.    There is minimal posterior disc bulging at the L5-S1 level. No other  significant posterior disc bulges or herniations are noted in the  lumbar spine.    The tip of the conus medullaris is at the mid L2 level which is within  normal limits. There is no evidence for intrathecal abnormality.     Level by level:     L1-L2: There is no spinal canal or neural foraminal stenosis at this  level.    L2-L3: There is mild facet arthropathy bilaterally. There is no spinal  canal or neural foraminal stenosis at this level.    L3-L4: There is mild facet arthropathy bilaterally. There is no spinal  canal or neural foraminal stenosis at this level.    L4-L5: There is mild facet arthropathy bilaterally and a minimal  circumferential disc bulge. These findings result in minimal bilateral  neural foraminal narrowing but no spinal canal stenosis.    L5-S1: There is severe facet arthropathy bilaterally and a  circumferential disc bulge. These findings result in moderate left  foraminal stenosis, mild right foraminal narrowing and minimal spinal  canal narrowing.      Impression    IMPRESSION: Degenerative changes of the lumbar spine as described  above. Chronic-appearing bilateral L5 pars interarticularis defects.    GUERRERO RIOS MD         Patient's conditions were thoroughly discussed during today's visit with greater than 50% of the visit  spent counseling the patient with total time spent face-to-face with the patient being 25 minutes.    Albert Yeo MD, [facility]          Again, thank you for allowing me to participate in the care of your patient.        Sincerely,        Albert Yeo, MD

## 2019-07-01 ENCOUNTER — THERAPY VISIT (OUTPATIENT)
Dept: PHYSICAL THERAPY | Facility: CLINIC | Age: 64
End: 2019-07-01
Payer: COMMERCIAL

## 2019-07-01 DIAGNOSIS — M43.00 PARS DEFECT WITHOUT SPONDYLOLISTHESIS: ICD-10-CM

## 2019-07-01 DIAGNOSIS — M47.816 LUMBAR FACET ARTHROPATHY: ICD-10-CM

## 2019-07-01 DIAGNOSIS — M51.369 LUMBAR DEGENERATIVE DISC DISEASE: ICD-10-CM

## 2019-07-01 DIAGNOSIS — M54.59 MECHANICAL LOW BACK PAIN: ICD-10-CM

## 2019-07-01 PROCEDURE — 97110 THERAPEUTIC EXERCISES: CPT | Mod: GP | Performed by: PHYSICAL THERAPIST

## 2019-07-01 PROCEDURE — 97161 PT EVAL LOW COMPLEX 20 MIN: CPT | Mod: GP | Performed by: PHYSICAL THERAPIST

## 2019-07-01 PROCEDURE — 97112 NEUROMUSCULAR REEDUCATION: CPT | Mod: GP | Performed by: PHYSICAL THERAPIST

## 2019-07-25 ENCOUNTER — THERAPY VISIT (OUTPATIENT)
Dept: PHYSICAL THERAPY | Facility: CLINIC | Age: 64
End: 2019-07-25
Payer: COMMERCIAL

## 2019-07-25 DIAGNOSIS — M54.59 MECHANICAL LOW BACK PAIN: ICD-10-CM

## 2019-07-25 PROCEDURE — 97110 THERAPEUTIC EXERCISES: CPT | Mod: GP | Performed by: PHYSICAL THERAPIST

## 2019-08-20 ENCOUNTER — THERAPY VISIT (OUTPATIENT)
Dept: PHYSICAL THERAPY | Facility: CLINIC | Age: 64
End: 2019-08-20
Payer: COMMERCIAL

## 2019-08-20 DIAGNOSIS — M54.59 MECHANICAL LOW BACK PAIN: ICD-10-CM

## 2019-08-20 PROCEDURE — 97112 NEUROMUSCULAR REEDUCATION: CPT | Mod: GP | Performed by: PHYSICAL THERAPIST

## 2019-08-20 PROCEDURE — 97530 THERAPEUTIC ACTIVITIES: CPT | Mod: GP | Performed by: PHYSICAL THERAPIST

## 2019-10-03 ENCOUNTER — HEALTH MAINTENANCE LETTER (OUTPATIENT)
Age: 64
End: 2019-10-03

## 2019-10-14 PROBLEM — M54.59 MECHANICAL LOW BACK PAIN: Status: RESOLVED | Noted: 2019-07-01 | Resolved: 2019-10-14

## 2019-10-14 NOTE — PROGRESS NOTES
DISCHARGE SUMMARY    Artie Carpenter was seen 3 times for evaluation and treatment.  Patient did not return for further treatment and current status is unknown.  Due to short treatment duration, no objective or functional changes were made.  Please see goal flow sheet from episode noted date below and initial evaluation for further information.  Patient is discharged from therapy and therapy episode is resolved as of 10/14/19.      Linked Episodes   Type: Episode: Status: Noted: Resolved: Last update: Updated by:   PHYSICAL THERAPY LBP 7119 Active 7/1/2019 8/20/2019  3:30 PM Samantha Martinez, PT      Comments:

## 2019-10-31 ENCOUNTER — HEALTH MAINTENANCE LETTER (OUTPATIENT)
Age: 64
End: 2019-10-31

## 2019-11-14 ENCOUNTER — TRANSFERRED RECORDS (OUTPATIENT)
Dept: HEALTH INFORMATION MANAGEMENT | Facility: CLINIC | Age: 64
End: 2019-11-14

## 2020-05-07 ENCOUNTER — TRANSFERRED RECORDS (OUTPATIENT)
Dept: HEALTH INFORMATION MANAGEMENT | Facility: CLINIC | Age: 65
End: 2020-05-07

## 2020-05-07 LAB
ALT SERPL-CCNC: 20 IU/L (ref 5–40)
AST SERPL-CCNC: 16 U/L (ref 5–34)
CREAT SERPL-MCNC: 0.87 MG/DL (ref 0.5–1.3)

## 2020-09-11 ENCOUNTER — RESULTS ONLY (OUTPATIENT)
Dept: LAB | Age: 65
End: 2020-09-11

## 2020-09-11 ENCOUNTER — OFFICE VISIT (OUTPATIENT)
Dept: URGENT CARE | Facility: URGENT CARE | Age: 65
End: 2020-09-11
Payer: COMMERCIAL

## 2020-09-11 DIAGNOSIS — Z53.9 ERRONEOUS ENCOUNTER--DISREGARD: Primary | ICD-10-CM

## 2020-09-12 LAB
SARS-COV-2 RNA SPEC QL NAA+PROBE: NOT DETECTED
SPECIMEN SOURCE: NORMAL

## 2020-11-05 ENCOUNTER — TRANSFERRED RECORDS (OUTPATIENT)
Dept: HEALTH INFORMATION MANAGEMENT | Facility: CLINIC | Age: 65
End: 2020-11-05

## 2020-11-07 ENCOUNTER — HEALTH MAINTENANCE LETTER (OUTPATIENT)
Age: 65
End: 2020-11-07

## 2021-05-06 ENCOUNTER — TRANSFERRED RECORDS (OUTPATIENT)
Dept: HEALTH INFORMATION MANAGEMENT | Facility: CLINIC | Age: 66
End: 2021-05-06

## 2021-05-06 LAB
ALT SERPL-CCNC: 19 IU/L (ref 5–40)
AST SERPL-CCNC: 18 U/L (ref 5–34)
CREAT SERPL-MCNC: 0.85 MG/DL (ref 0.5–1.3)
GFR SERPL CREATININE-BSD FRML MDRD: 96.5 ML/MIN/1.73M2

## 2021-08-13 ENCOUNTER — APPOINTMENT (OUTPATIENT)
Dept: URGENT CARE | Facility: URGENT CARE | Age: 66
End: 2021-08-13
Payer: COMMERCIAL

## 2021-08-13 PROCEDURE — U0003 INFECTIOUS AGENT DETECTION BY NUCLEIC ACID (DNA OR RNA); SEVERE ACUTE RESPIRATORY SYNDROME CORONAVIRUS 2 (SARS-COV-2) (CORONAVIRUS DISEASE [COVID-19]), AMPLIFIED PROBE TECHNIQUE, MAKING USE OF HIGH THROUGHPUT TECHNOLOGIES AS DESCRIBED BY CMS-2020-01-R: HCPCS | Performed by: INTERNAL MEDICINE

## 2021-09-03 ENCOUNTER — LAB REQUISITION (OUTPATIENT)
Dept: LAB | Facility: CLINIC | Age: 66
End: 2021-09-03

## 2021-09-03 LAB — SARS-COV-2 RNA RESP QL NAA+PROBE: NEGATIVE

## 2021-09-05 ENCOUNTER — HEALTH MAINTENANCE LETTER (OUTPATIENT)
Age: 66
End: 2021-09-05

## 2021-12-02 ENCOUNTER — TRANSFERRED RECORDS (OUTPATIENT)
Dept: HEALTH INFORMATION MANAGEMENT | Facility: CLINIC | Age: 66
End: 2021-12-02
Payer: COMMERCIAL

## 2021-12-02 LAB
ALT SERPL-CCNC: 24 IU/L (ref 5–40)
AST SERPL-CCNC: 19 U/L (ref 5–34)
CREATININE (EXTERNAL): 0.83 MG/DL (ref 0.5–1.3)
GFR ESTIMATED (EXTERNAL): 99.1 ML/MIN/1.73M2

## 2021-12-26 ENCOUNTER — HEALTH MAINTENANCE LETTER (OUTPATIENT)
Age: 66
End: 2021-12-26

## 2022-05-05 ENCOUNTER — TRANSFERRED RECORDS (OUTPATIENT)
Dept: HEALTH INFORMATION MANAGEMENT | Facility: CLINIC | Age: 67
End: 2022-05-05
Payer: COMMERCIAL

## 2022-05-05 LAB
ALT SERPL-CCNC: 22 IU/L (ref 5–40)
AST SERPL-CCNC: 22 U/L (ref 5–34)
CREATININE (EXTERNAL): 0.88 MG/DL (ref 0.5–1.3)

## 2022-06-12 ENCOUNTER — HEALTH MAINTENANCE LETTER (OUTPATIENT)
Age: 67
End: 2022-06-12

## 2022-10-23 ENCOUNTER — HEALTH MAINTENANCE LETTER (OUTPATIENT)
Age: 67
End: 2022-10-23

## 2022-11-10 ENCOUNTER — TRANSFERRED RECORDS (OUTPATIENT)
Dept: HEALTH INFORMATION MANAGEMENT | Facility: CLINIC | Age: 67
End: 2022-11-10

## 2022-11-10 LAB
ALT SERPL-CCNC: 22 IU/L (ref 5–40)
AST SERPL-CCNC: 15 U/L (ref 5–34)
CREATININE (EXTERNAL): 1.06 MG/DL (ref 0.5–1.3)

## 2022-12-28 SDOH — ECONOMIC STABILITY: FOOD INSECURITY: WITHIN THE PAST 12 MONTHS, YOU WORRIED THAT YOUR FOOD WOULD RUN OUT BEFORE YOU GOT MONEY TO BUY MORE.: NEVER TRUE

## 2022-12-28 SDOH — HEALTH STABILITY: PHYSICAL HEALTH: ON AVERAGE, HOW MANY MINUTES DO YOU ENGAGE IN EXERCISE AT THIS LEVEL?: 30 MIN

## 2022-12-28 SDOH — ECONOMIC STABILITY: INCOME INSECURITY: IN THE LAST 12 MONTHS, WAS THERE A TIME WHEN YOU WERE NOT ABLE TO PAY THE MORTGAGE OR RENT ON TIME?: NO

## 2022-12-28 SDOH — ECONOMIC STABILITY: TRANSPORTATION INSECURITY
IN THE PAST 12 MONTHS, HAS THE LACK OF TRANSPORTATION KEPT YOU FROM MEDICAL APPOINTMENTS OR FROM GETTING MEDICATIONS?: NO

## 2022-12-28 SDOH — ECONOMIC STABILITY: FOOD INSECURITY: WITHIN THE PAST 12 MONTHS, THE FOOD YOU BOUGHT JUST DIDN'T LAST AND YOU DIDN'T HAVE MONEY TO GET MORE.: NEVER TRUE

## 2022-12-28 SDOH — ECONOMIC STABILITY: INCOME INSECURITY: HOW HARD IS IT FOR YOU TO PAY FOR THE VERY BASICS LIKE FOOD, HOUSING, MEDICAL CARE, AND HEATING?: NOT HARD AT ALL

## 2022-12-28 SDOH — ECONOMIC STABILITY: TRANSPORTATION INSECURITY
IN THE PAST 12 MONTHS, HAS LACK OF TRANSPORTATION KEPT YOU FROM MEETINGS, WORK, OR FROM GETTING THINGS NEEDED FOR DAILY LIVING?: NO

## 2022-12-28 SDOH — HEALTH STABILITY: PHYSICAL HEALTH: ON AVERAGE, HOW MANY DAYS PER WEEK DO YOU ENGAGE IN MODERATE TO STRENUOUS EXERCISE (LIKE A BRISK WALK)?: 1 DAY

## 2022-12-28 ASSESSMENT — ENCOUNTER SYMPTOMS
JOINT SWELLING: 1
PALPITATIONS: 0
HEMATURIA: 0
HEMATOCHEZIA: 0
SORE THROAT: 0
CHILLS: 0
DIZZINESS: 0
ABDOMINAL PAIN: 0
HEARTBURN: 0
COUGH: 0
FREQUENCY: 0
DIARRHEA: 0
WEAKNESS: 0
FEVER: 0
MYALGIAS: 1
ARTHRALGIAS: 0
DYSURIA: 0
CONSTIPATION: 0
HEADACHES: 0
NERVOUS/ANXIOUS: 0
PARESTHESIAS: 0
SHORTNESS OF BREATH: 0
NAUSEA: 0
EYE PAIN: 0

## 2022-12-28 ASSESSMENT — SOCIAL DETERMINANTS OF HEALTH (SDOH)
HOW OFTEN DO YOU GET TOGETHER WITH FRIENDS OR RELATIVES?: ONCE A WEEK
HOW OFTEN DO YOU ATTEND CHURCH OR RELIGIOUS SERVICES?: NEVER
DO YOU BELONG TO ANY CLUBS OR ORGANIZATIONS SUCH AS CHURCH GROUPS UNIONS, FRATERNAL OR ATHLETIC GROUPS, OR SCHOOL GROUPS?: NO
IN A TYPICAL WEEK, HOW MANY TIMES DO YOU TALK ON THE PHONE WITH FAMILY, FRIENDS, OR NEIGHBORS?: MORE THAN THREE TIMES A WEEK

## 2022-12-28 ASSESSMENT — LIFESTYLE VARIABLES
HOW MANY STANDARD DRINKS CONTAINING ALCOHOL DO YOU HAVE ON A TYPICAL DAY: PATIENT DOES NOT DRINK
HOW OFTEN DO YOU HAVE A DRINK CONTAINING ALCOHOL: NEVER
HOW OFTEN DO YOU HAVE SIX OR MORE DRINKS ON ONE OCCASION: NEVER
SKIP TO QUESTIONS 9-10: 1
AUDIT-C TOTAL SCORE: 0

## 2022-12-28 ASSESSMENT — ACTIVITIES OF DAILY LIVING (ADL): CURRENT_FUNCTION: NO ASSISTANCE NEEDED

## 2022-12-30 ENCOUNTER — OFFICE VISIT (OUTPATIENT)
Dept: FAMILY MEDICINE | Facility: CLINIC | Age: 67
End: 2022-12-30
Payer: COMMERCIAL

## 2022-12-30 VITALS
WEIGHT: 208.2 LBS | BODY MASS INDEX: 32.68 KG/M2 | SYSTOLIC BLOOD PRESSURE: 120 MMHG | OXYGEN SATURATION: 97 % | RESPIRATION RATE: 13 BRPM | HEIGHT: 67 IN | DIASTOLIC BLOOD PRESSURE: 78 MMHG | TEMPERATURE: 98.1 F | HEART RATE: 74 BPM

## 2022-12-30 DIAGNOSIS — L40.50 PSORIATIC ARTHRITIS (H): ICD-10-CM

## 2022-12-30 DIAGNOSIS — Z23 HIGH PRIORITY FOR 2019-NCOV VACCINE: ICD-10-CM

## 2022-12-30 DIAGNOSIS — Z00.00 ROUTINE GENERAL MEDICAL EXAMINATION AT A HEALTH CARE FACILITY: Primary | ICD-10-CM

## 2022-12-30 DIAGNOSIS — L40.9 PSORIASIS: ICD-10-CM

## 2022-12-30 DIAGNOSIS — Z12.11 SCREEN FOR COLON CANCER: ICD-10-CM

## 2022-12-30 DIAGNOSIS — Z11.4 SCREENING FOR HIV (HUMAN IMMUNODEFICIENCY VIRUS): ICD-10-CM

## 2022-12-30 DIAGNOSIS — Z11.59 NEED FOR HEPATITIS C SCREENING TEST: ICD-10-CM

## 2022-12-30 DIAGNOSIS — G89.29 CHRONIC LEFT-SIDED LOW BACK PAIN WITH LEFT-SIDED SCIATICA: ICD-10-CM

## 2022-12-30 DIAGNOSIS — M54.42 CHRONIC LEFT-SIDED LOW BACK PAIN WITH LEFT-SIDED SCIATICA: ICD-10-CM

## 2022-12-30 PROBLEM — L40.8 OTHER PSORIASIS: Status: ACTIVE | Noted: 2022-12-30

## 2022-12-30 PROBLEM — E66.01 MORBID OBESITY (H): Status: RESOLVED | Noted: 2018-09-04 | Resolved: 2022-12-30

## 2022-12-30 LAB
CHOLEST SERPL-MCNC: 282 MG/DL
HBA1C MFR BLD: 6 % (ref 0–5.6)
HCV AB SERPL QL IA: NONREACTIVE
HDLC SERPL-MCNC: 41 MG/DL
HIV 1+2 AB+HIV1 P24 AG SERPL QL IA: NONREACTIVE
LDLC SERPL CALC-MCNC: 210 MG/DL
NONHDLC SERPL-MCNC: 241 MG/DL
TRIGL SERPL-MCNC: 157 MG/DL

## 2022-12-30 PROCEDURE — 83036 HEMOGLOBIN GLYCOSYLATED A1C: CPT | Performed by: FAMILY MEDICINE

## 2022-12-30 PROCEDURE — 36415 COLL VENOUS BLD VENIPUNCTURE: CPT | Performed by: FAMILY MEDICINE

## 2022-12-30 PROCEDURE — 80061 LIPID PANEL: CPT | Performed by: FAMILY MEDICINE

## 2022-12-30 PROCEDURE — 90471 IMMUNIZATION ADMIN: CPT | Performed by: FAMILY MEDICINE

## 2022-12-30 PROCEDURE — 0124A COVID-19 VACCINE BIVALENT BOOSTER 12+ (PFIZER): CPT | Performed by: FAMILY MEDICINE

## 2022-12-30 PROCEDURE — 90677 PCV20 VACCINE IM: CPT | Performed by: FAMILY MEDICINE

## 2022-12-30 PROCEDURE — 99397 PER PM REEVAL EST PAT 65+ YR: CPT | Mod: 25 | Performed by: FAMILY MEDICINE

## 2022-12-30 PROCEDURE — 91312 COVID-19 VACCINE BIVALENT BOOSTER 12+ (PFIZER): CPT | Performed by: FAMILY MEDICINE

## 2022-12-30 PROCEDURE — 86803 HEPATITIS C AB TEST: CPT | Performed by: FAMILY MEDICINE

## 2022-12-30 PROCEDURE — 87389 HIV-1 AG W/HIV-1&-2 AB AG IA: CPT | Performed by: FAMILY MEDICINE

## 2022-12-30 RX ORDER — CLOBETASOL PROPIONATE 0.5 MG/ML
SOLUTION TOPICAL 2 TIMES DAILY
Qty: 50 ML | Refills: 3 | Status: SHIPPED | OUTPATIENT
Start: 2022-12-30 | End: 2024-01-11

## 2022-12-30 RX ORDER — CLOBETASOL PROPIONATE 0.5 MG/G
OINTMENT TOPICAL
Qty: 45 G | Refills: 3 | Status: SHIPPED | OUTPATIENT
Start: 2022-12-30 | End: 2023-04-27

## 2022-12-30 RX ORDER — CALCITRIOL 3 UG/G
3 OINTMENT TOPICAL 2 TIMES DAILY
Qty: 45 G | Refills: 3 | Status: SHIPPED | OUTPATIENT
Start: 2022-12-30 | End: 2023-05-01

## 2022-12-30 ASSESSMENT — ENCOUNTER SYMPTOMS
CONSTIPATION: 0
JOINT SWELLING: 1
DIZZINESS: 0
ARTHRALGIAS: 0
FEVER: 0
HEARTBURN: 0
EYE PAIN: 0
WEAKNESS: 0
SORE THROAT: 0
NAUSEA: 0
COUGH: 0
HEADACHES: 0
ABDOMINAL PAIN: 0
PARESTHESIAS: 0
HEMATOCHEZIA: 0
MYALGIAS: 1
NERVOUS/ANXIOUS: 0
FREQUENCY: 0
DIARRHEA: 0
SHORTNESS OF BREATH: 0
DYSURIA: 0
CHILLS: 0
HEMATURIA: 0
PALPITATIONS: 0

## 2022-12-30 ASSESSMENT — ACTIVITIES OF DAILY LIVING (ADL): CURRENT_FUNCTION: NO ASSISTANCE NEEDED

## 2022-12-30 NOTE — PROGRESS NOTES
"SUBJECTIVE:   CC: Chandra is an 67 year old who presents for preventative health visit.   Patient has been advised of split billing requirements and indicates understanding: Yes  Healthy Habits:     In general, how would you rate your overall health?  Excellent    Frequency of exercise:  1 day/week    Duration of exercise:  15-30 minutes    Do you usually eat at least 4 servings of fruit and vegetables a day, include whole grains    & fiber and avoid regularly eating high fat or \"junk\" foods?  No    Taking medications regularly:  Yes    Medication side effects:  Not applicable    Ability to successfully perform activities of daily living:  No assistance needed    Home Safety:  No safety concerns identified    Hearing Impairment:  No hearing concerns    In the past 6 months, have you been bothered by leaking of urine?  No    In general, how would you rate your overall mental or emotional health?  Excellent      PHQ-2 Total Score: 0    Additional concerns today:  Yes    Pt is still struggling witih psoriatic rash, mainly on the forehead, legs, and back.           Today's PHQ-2 Score:   PHQ-2 ( 1999 Pfizer) 12/28/2022   Q1: Little interest or pleasure in doing things 0   Q2: Feeling down, depressed or hopeless 0   PHQ-2 Score 0   Q1: Little interest or pleasure in doing things Not at all   Q2: Feeling down, depressed or hopeless Not at all   PHQ-2 Score 0       Have you ever done Advance Care Planning? (For example, a Health Directive, POLST, or a discussion with a medical provider or your loved ones about your wishes): No, advance care planning information given to patient to review.  Patient plans to discuss their wishes with loved ones or provider.      Social History     Tobacco Use     Smoking status: Never     Smokeless tobacco: Never   Substance Use Topics     Alcohol use: No     Alcohol/week: 0.0 standard drinks     If you drink alcohol do you typically have >3 drinks per day or >7 drinks per week? No    Alcohol " Use 12/28/2022   Prescreen: >3 drinks/day or >7 drinks/week? Not Applicable   Prescreen: >3 drinks/day or >7 drinks/week? -       Last PSA:   PSA   Date Value Ref Range Status   09/04/2018 0.34 0 - 4 ug/L Final     Comment:     Assay Method:  Chemiluminescence using Siemens Vista analyzer       Reviewed orders with patient. Reviewed health maintenance and updated orders accordingly - Yes  BP Readings from Last 3 Encounters:   12/30/22 120/78   06/27/19 116/70   06/20/19 112/78    Wt Readings from Last 3 Encounters:   12/30/22 94.4 kg (208 lb 3.2 oz)   06/27/19 72.1 kg (159 lb)   06/20/19 82.1 kg (181 lb)                  Patient Active Problem List   Diagnosis     Hyperlipidemia LDL goal <160     Psoriatic arthritis (H)     Class 1 obesity due to excess calories with serious comorbidity and body mass index (BMI) of 31.0 to 31.9 in adult     Umbilical hernia     Other psoriasis     Chronic left-sided low back pain with left-sided sciatica     Past Surgical History:   Procedure Laterality Date     COLONOSCOPY N/A 10/4/2016    Procedure: COLONOSCOPY;  Surgeon: Timmy Roper MD;  Location:  GI     ORTHOPEDIC SURGERY         Social History     Tobacco Use     Smoking status: Never     Smokeless tobacco: Never   Substance Use Topics     Alcohol use: No     Alcohol/week: 0.0 standard drinks     Family History   Problem Relation Age of Onset     Lung Cancer Mother      Prostate Cancer Brother      Diabetes Sister          Current Outpatient Medications   Medication Sig Dispense Refill     adalimumab (HUMIRA) 40 MG/0.8ML prefilled syringe kit Inject 40 mg Subcutaneous every 14 days       Calcitriol (VECTICAL) 3 MCG/GM OINT Externally apply 3 mcg topically 2 times daily 45 g 3     clobetasol (TEMOVATE) 0.05 % external ointment Apply to affected skin   twice weekly prn 45 g 3     clobetasol (TEMOVATE) 0.05 % external solution Apply topically 2 times daily 50 mL 3     No Known Allergies    Reviewed and updated as needed  "this visit by clinical staff   Tobacco  Allergies  Meds              Reviewed and updated as needed this visit by Provider                 Past Medical History:   Diagnosis Date     Arthritis      Exogenous obesity      Psoriasis      Psoriatic arthritis (H)       Past Surgical History:   Procedure Laterality Date     COLONOSCOPY N/A 10/4/2016    Procedure: COLONOSCOPY;  Surgeon: Timmy Roper MD;  Location:  GI     ORTHOPEDIC SURGERY         Review of Systems   Constitutional: Negative for chills and fever.   HENT: Negative for congestion, ear pain, hearing loss and sore throat.    Eyes: Negative for pain and visual disturbance.   Respiratory: Negative for cough and shortness of breath.    Cardiovascular: Negative for chest pain, palpitations and peripheral edema.   Gastrointestinal: Negative for abdominal pain, constipation, diarrhea, heartburn, hematochezia and nausea.   Genitourinary: Negative for dysuria, frequency, genital sores, hematuria, impotence, penile discharge and urgency.   Musculoskeletal: Positive for joint swelling and myalgias. Negative for arthralgias.   Skin: Negative for rash.   Neurological: Negative for dizziness, weakness, headaches and paresthesias.   Psychiatric/Behavioral: Negative for mood changes. The patient is not nervous/anxious.          OBJECTIVE:   /78 (BP Location: Right arm, Patient Position: Sitting, Cuff Size: Adult Large)   Pulse 74   Temp 98.1  F (36.7  C) (Oral)   Resp 13   Ht 1.702 m (5' 7\")   Wt 94.4 kg (208 lb 3.2 oz)   SpO2 97%   BMI 32.61 kg/m      Physical Exam  GENERAL: healthy, alert and no distress  EYES: Eyes grossly normal to inspection, PERRL and conjunctivae and sclerae normal  HENT: ear canals and TM's normal, nose and mouth without ulcers or lesions  NECK: no adenopathy, no asymmetry, masses, or scars and thyroid normal to palpation  RESP: lungs clear to auscultation - no rales, rhonchi or wheezes  CV: regular rate and rhythm, normal S1 " S2, no S3 or S4, no murmur, click or rub, no peripheral edema and peripheral pulses strong  ABDOMEN: soft, nontender, no hepatosplenomegaly, no masses and bowel sounds normal  MS: arthritis of the hands.   SKIN: erythematous papules on the legs, back and forehead, with scales.  NEURO: Normal strength and tone, mentation intact and speech normal  PSYCH: mentation appears normal, affect normal/bright        ASSESSMENT/PLAN:   (Z00.00) Routine general medical examination at a health care facility  (primary encounter diagnosis)  Comment: Today I counseled the patient about diet, regular exercise and weight loss planning.  Plan: Lipid panel reflex to direct LDL Fasting,         Hemoglobin A1c            (Z11.4) Screening for HIV (human immunodeficiency virus)  Comment:   Plan: HIV Antigen Antibody Combo            (Z11.59) Need for hepatitis C screening test  Comment:   Plan: Hepatitis C Screen Reflex to HCV RNA Quant and         Genotype            (Z12.11) Screen for colon cancer  Comment: last colonoscopy 2016 normal, no family hx of colon cancer   Plan: repeat in 3 years.     (L40.9) Psoriasis  Comment: refill given for   Plan: clobetasol (TEMOVATE) 0.05 % external ointment,        clobetasol (TEMOVATE) 0.05 % external solution,        Calcitriol (VECTICAL) 3 MCG/GM OINT            (L40.50) Psoriatic arthritis (H)  Comment: continue on Humira  Plan: follows up with Dr. [facility].    (M54.42,  G89.29) Chronic left-sided low back pain with left-sided sciatica  Comment: discussed options for treatment.   Plan: PT or Yoga can be beneficial. Pt will think about it.     (Z23) High priority for 2019-nCoV vaccine  Comment:   Plan: \          COUNSELING:   Reviewed preventive health counseling, as reflected in patient instructions       Regular exercise       Healthy diet/nutrition        He reports that he has never smoked. He has never used smokeless tobacco.        Jeison Zhu MD  [facility]

## 2022-12-30 NOTE — PATIENT INSTRUCTIONS
Preventive Health Recommendations:     See your health care provider every year to    Review health changes.     Discuss preventive care.      Review your medicines if your doctor has prescribed any.      Talk with your health care provider about whether you should have a test to screen for prostate cancer (PSA).    Every 3 years, have a diabetes test (fasting glucose). If you are at risk for diabetes, you should have this test more often.    Every 5 years, have a cholesterol test. Have this test more often if you are at risk for high cholesterol or heart disease.     Every 10 years, have a colonoscopy. Or, have a yearly FIT test (stool test). These exams will check for colon cancer.    Talk to with your health care provider about screening for Abdominal Aortic Aneurysm if you have a family history of AAA or have a history of smoking.    Shots:     Get a flu shot each year.     Get a tetanus shot every 10 years.     Talk to your doctor about your pneumonia vaccines. There are now two you should receive - Pneumovax (PPSV 23) and Prevnar (PCV 13).     Talk to your pharmacist about a shingles vaccine.     Talk to your doctor about the hepatitis B vaccine.  Nutrition:     Eat at least 5 servings of fruits and vegetables each day.     Eat whole-grain bread, whole-wheat pasta and brown rice instead of white grains and rice.     Get adequate Calcium and Vitamin D.   Lifestyle    Exercise for at least 150 minutes a week (30 minutes a day, 5 days a week). This will help you control your weight and prevent disease.     Limit alcohol to one drink per day.     No smoking.     Wear sunscreen to prevent skin cancer.    See your dentist every six months for an exam and cleaning.    See your eye doctor every 1 to 2 years to screen for conditions such as glaucoma, macular degeneration, cataracts, etc.    Personalized Prevention Plan  You are due for the preventive services outlined below.  Your care team is available to assist you  in scheduling these services.  If you have already completed any of these items, please share that information with your care team to update in your medical record.  Health Maintenance Due   Topic Date Due     ANNUAL REVIEW OF HM ORDERS  Never done     Discuss Advance Care Planning  Never done     Pneumococcal Vaccine (1 - PCV) Never done     HIV Screening  Never done     Hepatitis C Screening  Never done     Colorectal Cancer Screening  10/04/2021     Annual Wellness Visit  03/29/2022     AORTIC ANEURYSM SCREENING (SYSTEM ASSIGNED)  Never done     COVID-19 Vaccine (5 - Booster for Pfizer series) 08/05/2022

## 2023-01-01 ENCOUNTER — TELEPHONE (OUTPATIENT)
Dept: FAMILY MEDICINE | Facility: CLINIC | Age: 68
End: 2023-01-01

## 2023-01-01 NOTE — TELEPHONE ENCOUNTER
Please call Chandra:  2 things : cholesterol is extremely high, I recommend starting on statin drugs like atorvastatin or rosuvostatin.  Also blood sugar is getting higher, so I recommend, diet, exercise and weight loss.  Recheck in 1 year.  Jeison Zhu MD  [facility]  [phone]

## 2023-01-02 ENCOUNTER — MYC MEDICAL ADVICE (OUTPATIENT)
Dept: FAMILY MEDICINE | Facility: CLINIC | Age: 68
End: 2023-01-02

## 2023-01-02 NOTE — TELEPHONE ENCOUNTER
Dr. Zhu   Please see patient response via my chart     Lynnette Villafana, Registered Nurse  [facility]

## 2023-01-02 NOTE — TELEPHONE ENCOUNTER
Called patient and left voicemail to call back and ask to speak to any triage nurse.    Sherrill Mac RN

## 2023-01-02 NOTE — TELEPHONE ENCOUNTER
Patient calling back.  Advised of below.  Declined statin.  He will work on diet.  Belinda Barnes RN

## 2023-03-02 ENCOUNTER — VIRTUAL VISIT (OUTPATIENT)
Dept: FAMILY MEDICINE | Facility: CLINIC | Age: 68
End: 2023-03-02
Payer: COMMERCIAL

## 2023-03-02 ENCOUNTER — TELEPHONE (OUTPATIENT)
Dept: FAMILY MEDICINE | Facility: CLINIC | Age: 68
End: 2023-03-02

## 2023-03-02 DIAGNOSIS — U07.1 CLINICAL DIAGNOSIS OF COVID-19: Primary | ICD-10-CM

## 2023-03-02 DIAGNOSIS — L40.50 PSORIATIC ARTHRITIS (H): ICD-10-CM

## 2023-03-02 PROCEDURE — 99213 OFFICE O/P EST LOW 20 MIN: CPT | Mod: CS | Performed by: FAMILY MEDICINE

## 2023-03-02 NOTE — TELEPHONE ENCOUNTER
Patient calling and worked last night and had + COVID test today.  Did advise letting [facility] know.  Patient immunocompromised and on Humira.  Will need 20 days off and note to return to work.  Scheduled at 1:30 pm with Dr. Zhu to discuss treatment.  Belinda Barnes RN

## 2023-03-02 NOTE — PROGRESS NOTES
"Chandra is a 67 year old who is being evaluated via a billable video visit.      How would you like to obtain your AVS? MyChart   If the video visit is dropped, the invitation should be resent by: Text to cell phone: [phone]  Will anyone else be joining your video visit? No        Assessment & Plan     Psoriatic arthritis (H)  Controlled, continue on Humira    Clinical diagnosis of COVID-19  Given his risk with immunotherapy, I recommends starting on   - nirmatrelvir and ritonavir (PAXLOVID) therapy pack; Take 3 tablets by mouth 2 times daily for 5 days (Take 2 Nirmatrelvir tablets and 1 Ritonavir tablet twice daily for 5 days)  In regards to quarantine, I did check Uptodate, as patient is moderately compromised, he can do 20 days quarantine, also he can stop quarantine if 2 negative antigen tests within 48 hours in between.             BMI:   Estimated body mass index is 32.61 kg/m  as calculated from the following:    Height as of 12/30/22: 1.702 m (5' 7\").    Weight as of 12/30/22: 94.4 kg (208 lb 3.2 oz).   Weight management plan: Discussed healthy diet and exercise guidelines          Jeison Zhu MD  [facility]   Chandra is a 67 year old, presenting for the following health issues:  Covid Concern      History of Present Illness       Reason for visit:  Covid positive  Symptom onset:  Today  Symptoms include:  Stuffy nose  Symptom intensity:  Mild  Symptom progression:  Staying the same  Had these symptoms before:  Yes  Has tried/received treatment for these symptoms:  No  What makes it worse:  No  What makes it better:  No    He eats 4 or more servings of fruits and vegetables daily.He consumes 1 sweetened beverage(s) daily.He exercises with enough effort to increase his heart rate 20 to 29 minutes per day.  He exercises with enough effort to increase his heart rate 4 days per week.   He is taking medications regularly.         COVID-19 Symptom Review  How many days ago " "did these symptoms start?     Are any of the following symptoms significant for you?  Congestion and stuffiness.    New or worsening difficulty breathing? No    Worsening cough? No    Fever or chills? No    Headache: No    Sore throat: No    Chest pain: No    Diarrhea: No    Body aches? No    What treatments has patient tried? None    Does patient live in a nursing home, group home, or shelter? No  Does patient have a way to get food/medications during quarantined? Yes, I have a friend or family member who can help me.            Review of Systems   CONSTITUTIONAL: NEGATIVE for fever, chills, change in weight      Objective    Vitals - Patient Reported  Weight (Patient Reported): 88.9 kg (196 lb)  Height (Patient Reported): 170.2 cm (5' 7\")  BMI (Based on Pt Reported Ht/Wt): 30.7      Vitals:  No vitals were obtained today due to virtual visit.    Physical Exam   GENERAL: Healthy, alert and no distress  RESP: No audible wheeze, cough, or visible cyanosis.  No visible retractions or increased work of breathing.    SKIN: Visible skin clear. No significant rash, abnormal pigmentation or lesions.  NEURO: Cranial nerves grossly intact.  Mentation and speech appropriate for age.  PSYCH: Mentation appears normal, affect normal/bright, judgement and insight intact, normal speech and appearance well-groomed.                Video-Visit Details    Type of service:  Video Visit     Originating Location (pt. Location): Home  Distant Location (provider location):  On-site  Platform used for Video Visit: William"

## 2023-03-16 ENCOUNTER — MYC MEDICAL ADVICE (OUTPATIENT)
Dept: FAMILY MEDICINE | Facility: CLINIC | Age: 68
End: 2023-03-16
Payer: COMMERCIAL

## 2023-03-16 NOTE — LETTER
March 17, 2023      Artie Carpenter  [address] [facility] [address]      Attn:  Ana Paula Hernandez   Fax:  [phone]    To Whom It May Concern,     Artie had a visit 3/2/23.  Given his risk with immunotherapy, recommendations are to complete a 20 day quarantine from the first onset of symptoms. Patient did test positive after 2 negative tests (which could be rebound COVID after Paxlovid). He should complete the 20 day quarantine as previously started, timeline does not start over with the positive testing after 2 negative tests.    Sincerely,        Ngoc Baldwin PA-C (covering for Dr. Jeison Zhu MD)

## 2023-03-16 NOTE — TELEPHONE ENCOUNTER
"See Mychart message, pt needs letter, pt is immunocompromised, pt wants work note faxed, LM for pt to call, see below, can't tell if pt has had 2 negative Covid test? Does he want letter for 20 day quarantine?  Confirm what pt needs    \"In regards to quarantine, I did check Uptodate, as patient is moderately compromised, he can do 20 days quarantine, also he can stop quarantine if 2 negative antigen tests within 48 hours in between\"    Guadalupe Rabago RN, BSN  [facility]  .           "

## 2023-03-17 NOTE — TELEPHONE ENCOUNTER
Patient calling.  States he needs initial documentation that he needs 20 day quarantine.  Would quarantine be altered as he had a positive COVID test after the 2 negative COVID tests.  Need letter with initial recommendation and also need to advise on if positive COVID after tne 2 negative affect initial plan and if related to Paxlovid and rebound COVID.  I started letter with initial recommendation.  Will need to add about positive after the 2 negative.  Fax to Ana Paula Hernandez at [phone].  Mychart him back with plan also.  Does he just finish out the initial 20 day quarantine.  Please advise.  Belinda Barnes RN

## 2023-03-17 NOTE — TELEPHONE ENCOUNTER
Sent mychart response, pt has not returned our call  Guadalupe Rabago RN, BSN  [facility]

## 2023-04-27 ENCOUNTER — MYC MEDICAL ADVICE (OUTPATIENT)
Dept: FAMILY MEDICINE | Facility: CLINIC | Age: 68
End: 2023-04-27
Payer: COMMERCIAL

## 2023-04-27 DIAGNOSIS — L40.9 PSORIASIS: ICD-10-CM

## 2023-04-27 RX ORDER — CLOBETASOL PROPIONATE 0.5 MG/G
OINTMENT TOPICAL
Qty: 45 G | Refills: 3 | Status: SHIPPED | OUTPATIENT
Start: 2023-04-27

## 2023-04-27 NOTE — TELEPHONE ENCOUNTER
Dr. Zhu- see [email or web address] messages below.  Called our pharmacy to clarify RX for clobetasol oint.  He has not filled since December and that was for 15 G.  He is wanting 45 G RX though.  Need to specify how many days 45 grams to last such as 30 or 90 or how much to apply in grams.  I did add note of 45 g to last 90 days.  Please advise.  Belinda Barnes RN

## 2023-05-01 RX ORDER — CALCITRIOL 3 UG/G
OINTMENT TOPICAL
Qty: 100 G | Refills: 1 | Status: SHIPPED | OUTPATIENT
Start: 2023-05-01

## 2023-05-01 NOTE — TELEPHONE ENCOUNTER
Prescription approved per [facility] Refill Protocol.    Sherrill Mac RN

## 2023-05-11 ENCOUNTER — TRANSFERRED RECORDS (OUTPATIENT)
Dept: HEALTH INFORMATION MANAGEMENT | Facility: CLINIC | Age: 68
End: 2023-05-11
Payer: COMMERCIAL

## 2023-06-07 ENCOUNTER — VIRTUAL VISIT (OUTPATIENT)
Dept: FAMILY MEDICINE | Facility: CLINIC | Age: 68
End: 2023-06-07
Payer: COMMERCIAL

## 2023-06-07 DIAGNOSIS — E78.5 HYPERLIPIDEMIA LDL GOAL <160: Primary | ICD-10-CM

## 2023-06-07 DIAGNOSIS — M25.561 ACUTE PAIN OF RIGHT KNEE: ICD-10-CM

## 2023-06-07 PROCEDURE — 99213 OFFICE O/P EST LOW 20 MIN: CPT | Mod: VID | Performed by: FAMILY MEDICINE

## 2023-06-07 RX ORDER — ROSUVASTATIN CALCIUM 20 MG/1
20 TABLET, COATED ORAL DAILY
Qty: 90 TABLET | Refills: 3 | Status: SHIPPED | OUTPATIENT
Start: 2023-06-07 | End: 2024-01-11

## 2023-06-07 NOTE — PROGRESS NOTES
Chandra is a 68 year old who is being evaluated via a billable video visit.      How would you like to obtain your AVS? MyChart  If the video visit is dropped, the invitation should be resent by: Text to cell phone: [phone]  Will anyone else be joining your video visit? No        Assessment & Plan     Hyperlipidemia LDL goal <160  Very high LDL, start on high statin   - rosuvastatin (CRESTOR) 20 MG tablet; Take 1 tablet (20 mg) by mouth daily    Acute pain of right knee  Pt to come back tomorrow for knee exam, then decide what to do next.        Pt came to the office on 6/7, and here is the exam of the knee  Knee exam:  Inspection:normal   no antalgic gait,no soft tissue tenderness over knee, effusion negative,  range of motion of the knee is normal, - anterior and posterior drawer sign, - pivot-shift, collateral ligaments intact, - Natan sign, - Apley's sign, - Lachmann sign, no tenderness over tibial tubercle.  X-ray: not indicated.               Jeison Zhu MD  [facility]   Chandra is a 68 year old, presenting for the following health issues:  Medication Question (Regarding cholesterol)        6/7/2023     4:55 PM   Additional Questions   Roomed by Hillary ADAMES CMA     History of Present Illness       Hyperlipidemia:  He presents for follow up of hyperlipidemia.  He is not taking medication to lower cholesterol. He is not having myalgia or other side effects to statin medications.    He eats 2-3 servings of fruits and vegetables daily.He consumes 0 sweetened beverage(s) daily.He exercises with enough effort to increase his heart rate 60 or more minutes per day.  He exercises with enough effort to increase his heart rate 3 or less days per week.   He is taking medications regularly.       Pain History:  When did you first notice your pain? 1 month ago.   Have you seen this provider for your pain in the past? No   Where in your body do your have pain? Knee pain.   Are you  "seeing anyone else for your pain? No  What makes your pain better? Morning, and ibuprofen.  What makes your pain worse? Standing on it, and walking.  How has pain affected your ability to work? Not applicable  Who lives in your household? Wife.                 Chronic Pain - Initial Assessment:    How would you describe your pain? Chronic knee pain.  Have you had any recent changes to the severity or character of your pain? Worsening in the right knee.  Is there an underlying cause that has been identified? Hx of surgery of both knees. 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  Has your ability to work or do daily activities changed recently because of your pain? Worsening.  Which of these pain treatments have you tried? Tylenol and motrin.  Previous medication treatments:                    Review of Systems         Objective    Vitals - Patient Reported  Weight (Patient Reported): 88.5 kg (195 lb)  Height (Patient Reported): 170.2 cm (5' 7\")  BMI (Based on Pt Reported Ht/Wt): 30.54        Physical Exam   GENERAL: Healthy, alert and no distress  MS: normal ROM of the knee.  PSYCH: Mentation appears normal, affect normal/bright, judgement and insight intact, normal speech and appearance well-groomed.                Video-Visit Details    Type of service:  Video Visit     Originating Location (pt. Location): Home  Distant Location (provider location):  On-site  Platform used for Video Visit: William"

## 2023-06-20 ENCOUNTER — MYC MEDICAL ADVICE (OUTPATIENT)
Dept: FAMILY MEDICINE | Facility: CLINIC | Age: 68
End: 2023-06-20
Payer: COMMERCIAL

## 2023-06-20 DIAGNOSIS — M25.561 ACUTE PAIN OF RIGHT KNEE: Primary | ICD-10-CM

## 2023-06-20 NOTE — TELEPHONE ENCOUNTER
Dr. Zhu-    See [facility]: in person visit needed?    Patient requests MRI of right knee  Patient reports still experiencing knee pain.   -Has tried compression sleeve, BID ice packs, 3 gm tylenol, [id number] mg ibuprofen daily   -By late afternoon unable to bear weight bending to step down with left foot or up with right foot.   -Patient had similar symptoms in 2012 & needed surgery.    Patient has not noticed side effects from statin at this time.     Per visit note 6/7/23:  Acute pain of right knee  Pt to come back tomorrow for knee exam, then decide what to do next.    Margaux NEGRON RN, BSN, PHN  [facility]  [phone]

## 2023-06-23 ENCOUNTER — TELEPHONE (OUTPATIENT)
Dept: FAMILY MEDICINE | Facility: CLINIC | Age: 68
End: 2023-06-23
Payer: COMMERCIAL

## 2023-06-23 ENCOUNTER — HOSPITAL ENCOUNTER (OUTPATIENT)
Dept: MRI IMAGING | Facility: HOSPITAL | Age: 68
Discharge: HOME | End: 2023-06-23
Attending: FAMILY MEDICINE | Admitting: FAMILY MEDICINE
Payer: COMMERCIAL

## 2023-06-23 DIAGNOSIS — M25.561 ACUTE PAIN OF RIGHT KNEE: ICD-10-CM

## 2023-06-23 PROCEDURE — 73721 MRI JNT OF LWR EXTRE W/O DYE: CPT | Mod: RT

## 2023-06-23 NOTE — TELEPHONE ENCOUNTER
The MR of the knee is showing mainly arthritis of the medial part of the knee, and I think it is the main cause of his symptoms.  There is no acute injury, no meniscus or ligament tear.

## 2023-06-27 NOTE — TELEPHONE ENCOUNTER
Notified Patient of provider's message.   Person was given an opportunity to ask questions, verbalized understanding of plan, and is agreeable.     Sybil Zimmerman RN

## 2023-09-07 ENCOUNTER — TRANSFERRED RECORDS (OUTPATIENT)
Dept: HEALTH INFORMATION MANAGEMENT | Facility: CLINIC | Age: 68
End: 2023-09-07
Payer: COMMERCIAL

## 2023-09-07 LAB
ALT SERPL-CCNC: 22 IU/L (ref 5–40)
AST SERPL-CCNC: 18 U/L (ref 5–34)
CREATININE (EXTERNAL): 0.78 MG/DL (ref 0.5–1.3)
GFR ESTIMATED (EXTERNAL): 105.8 ML/MIN/1.73M2

## 2023-11-02 ENCOUNTER — TRANSFERRED RECORDS (OUTPATIENT)
Dept: HEALTH INFORMATION MANAGEMENT | Facility: CLINIC | Age: 68
End: 2023-11-02
Payer: MEDICARE

## 2023-11-08 ENCOUNTER — IMMUNIZATION (OUTPATIENT)
Dept: FAMILY MEDICINE | Facility: CLINIC | Age: 68
End: 2023-11-08
Payer: MEDICARE

## 2023-11-08 DIAGNOSIS — Z23 NEED FOR COVID-19 VACCINE: Primary | ICD-10-CM

## 2023-11-08 DIAGNOSIS — Z23 NEED FOR INFLUENZA VACCINATION: ICD-10-CM

## 2023-11-08 PROCEDURE — 99207 PR NO CHARGE NURSE ONLY: CPT

## 2023-11-08 PROCEDURE — G0008 ADMIN INFLUENZA VIRUS VAC: HCPCS

## 2023-11-08 PROCEDURE — 90480 ADMN SARSCOV2 VAC 1/ONLY CMP: CPT

## 2023-11-08 PROCEDURE — 90662 IIV NO PRSV INCREASED AG IM: CPT

## 2023-11-08 PROCEDURE — 91320 SARSCV2 VAC 30MCG TRS-SUC IM: CPT

## 2023-11-08 NOTE — PROGRESS NOTES
Prior to immunization administration, verified patients identity using patient s name and date of birth. Please see Immunization Activity for additional information.     Screening Questionnaire for Adult Immunization    Are you sick today?   No   Do you have allergies to medications, food, a vaccine component or latex?   No   Have you ever had a serious reaction after receiving a vaccination?   No   Do you have a long-term health problem with heart, lung, kidney, or metabolic disease (e.g., diabetes), asthma, a blood disorder, no spleen, complement component deficiency, a cochlear implant, or a spinal fluid leak?  Are you on long-term aspirin therapy?   No   Do you have cancer, leukemia, HIV/AIDS, or any other immune system problem?   No   Do you have a parent, brother, or sister with an immune system problem?   No   In the past 3 months, have you taken medications that affect  your immune system, such as prednisone, other steroids, or anticancer drugs; drugs for the treatment of rheumatoid arthritis, Crohn s disease, or psoriasis; or have you had radiation treatments?   No   Have you had a seizure, or a brain or other nervous system problem?   No   During the past year, have you received a transfusion of blood or blood    products, or been given immune (gamma) globulin or antiviral drug?   No   For women: Are you pregnant or is there a chance you could become       pregnant during the next month?   No   Have you received any vaccinations in the past 4 weeks?   No     Immunization questionnaire answers were all negative.    I have reviewed the following standing orders:   This patient is due and qualifies for the Covid-19 vaccine.     Click here for COVID-19 Standing Order    I have reviewed the vaccines inclusion and exclusion criteria; No concerns regarding eligibility.     This patient is due and qualifies for the Influenza vaccine.    Click here for Influenza Vaccine Standing Order    I have reviewed the  vaccines inclusion and exclusion criteria; No concerns regarding eligibility.     Patient instructed to remain in clinic for 15 minutes afterwards, and to report any adverse reactions.     Screening performed by Araceli Levin CMA on 11/8/2023 at 10:01 AM.

## 2024-01-04 ENCOUNTER — LAB (OUTPATIENT)
Dept: LAB | Facility: CLINIC | Age: 69
End: 2024-01-04
Payer: MEDICARE

## 2024-01-04 DIAGNOSIS — Z12.5 ENCOUNTER FOR SCREENING FOR MALIGNANT NEOPLASM OF PROSTATE: ICD-10-CM

## 2024-01-04 DIAGNOSIS — Z00.00 MEDICARE ANNUAL WELLNESS VISIT, SUBSEQUENT: ICD-10-CM

## 2024-01-04 LAB
CHOLEST SERPL-MCNC: 175 MG/DL
FASTING STATUS PATIENT QL REPORTED: YES
HBA1C MFR BLD: 5.9 % (ref 0–5.6)
HDLC SERPL-MCNC: 43 MG/DL
LDLC SERPL CALC-MCNC: 106 MG/DL
NONHDLC SERPL-MCNC: 132 MG/DL
PSA SERPL DL<=0.01 NG/ML-MCNC: 0.26 NG/ML (ref 0–4.5)
TRIGL SERPL-MCNC: 130 MG/DL

## 2024-01-04 PROCEDURE — 36415 COLL VENOUS BLD VENIPUNCTURE: CPT

## 2024-01-04 PROCEDURE — 83036 HEMOGLOBIN GLYCOSYLATED A1C: CPT | Mod: GZ

## 2024-01-04 PROCEDURE — 80061 LIPID PANEL: CPT

## 2024-01-04 PROCEDURE — G0103 PSA SCREENING: HCPCS

## 2024-01-11 ENCOUNTER — OFFICE VISIT (OUTPATIENT)
Dept: FAMILY MEDICINE | Facility: CLINIC | Age: 69
End: 2024-01-11
Attending: FAMILY MEDICINE
Payer: MEDICARE

## 2024-01-11 VITALS
BODY MASS INDEX: 32.74 KG/M2 | TEMPERATURE: 98.1 F | WEIGHT: 208.6 LBS | SYSTOLIC BLOOD PRESSURE: 130 MMHG | DIASTOLIC BLOOD PRESSURE: 87 MMHG | HEIGHT: 67 IN | RESPIRATION RATE: 14 BRPM | HEART RATE: 88 BPM | OXYGEN SATURATION: 96 %

## 2024-01-11 DIAGNOSIS — L40.50 PSORIATIC ARTHRITIS (H): ICD-10-CM

## 2024-01-11 DIAGNOSIS — Z29.11 NEED FOR VACCINATION AGAINST RESPIRATORY SYNCYTIAL VIRUS: ICD-10-CM

## 2024-01-11 DIAGNOSIS — Z80.42 FAMILY HX OF PROSTATE CANCER: ICD-10-CM

## 2024-01-11 DIAGNOSIS — Z00.00 ENCOUNTER FOR MEDICARE ANNUAL WELLNESS EXAM: Primary | ICD-10-CM

## 2024-01-11 DIAGNOSIS — E78.5 HYPERLIPIDEMIA LDL GOAL <160: ICD-10-CM

## 2024-01-11 PROCEDURE — G0402 INITIAL PREVENTIVE EXAM: HCPCS | Performed by: FAMILY MEDICINE

## 2024-01-11 PROCEDURE — 99213 OFFICE O/P EST LOW 20 MIN: CPT | Mod: 25 | Performed by: FAMILY MEDICINE

## 2024-01-11 RX ORDER — INFLIXIMAB 100 MG/10ML
5 INJECTION, POWDER, LYOPHILIZED, FOR SOLUTION INTRAVENOUS ONCE
COMMUNITY
End: 2024-01-11

## 2024-01-11 RX ORDER — METHOTREXATE 2.5 MG/1
15 TABLET ORAL
COMMUNITY
Start: 2024-01-11

## 2024-01-11 RX ORDER — FOLIC ACID 1 MG/1
TABLET ORAL
COMMUNITY
Start: 2023-05-11

## 2024-01-11 RX ORDER — INFLIXIMAB 100 MG/10ML
INJECTION, POWDER, LYOPHILIZED, FOR SOLUTION INTRAVENOUS
COMMUNITY
Start: 2024-01-11

## 2024-01-11 RX ORDER — RESPIRATORY SYNCYTIAL VIRUS VACCINE 120MCG/0.5
0.5 KIT INTRAMUSCULAR ONCE
Qty: 1 EACH | Refills: 0 | Status: CANCELLED | OUTPATIENT
Start: 2024-01-11 | End: 2024-01-11

## 2024-01-11 RX ORDER — ROSUVASTATIN CALCIUM 20 MG/1
20 TABLET, COATED ORAL DAILY
Qty: 90 TABLET | Refills: 3 | Status: SHIPPED | OUTPATIENT
Start: 2024-01-11

## 2024-01-11 SDOH — HEALTH STABILITY: PHYSICAL HEALTH: ON AVERAGE, HOW MANY DAYS PER WEEK DO YOU ENGAGE IN MODERATE TO STRENUOUS EXERCISE (LIKE A BRISK WALK)?: 1 DAY

## 2024-01-11 ASSESSMENT — ENCOUNTER SYMPTOMS
CHILLS: 0
PALPITATIONS: 0
NAUSEA: 0
COUGH: 0
FREQUENCY: 0
MYALGIAS: 1
DIARRHEA: 0
DYSURIA: 0
ABDOMINAL PAIN: 0
CONSTIPATION: 0
FEVER: 0
HEARTBURN: 0
WEAKNESS: 0
PARESTHESIAS: 0
DIZZINESS: 0
JOINT SWELLING: 1
SORE THROAT: 0
SHORTNESS OF BREATH: 0
HEADACHES: 0
HEMATOCHEZIA: 0
NERVOUS/ANXIOUS: 0
ARTHRALGIAS: 1
HEMATURIA: 0
EYE PAIN: 0

## 2024-01-11 ASSESSMENT — SOCIAL DETERMINANTS OF HEALTH (SDOH)
HOW OFTEN DO YOU ATTEND CHURCH OR RELIGIOUS SERVICES?: NEVER
HOW OFTEN DO YOU ATTENT MEETINGS OF THE CLUB OR ORGANIZATION YOU BELONG TO?: NEVER
IN A TYPICAL WEEK, HOW MANY TIMES DO YOU TALK ON THE PHONE WITH FAMILY, FRIENDS, OR NEIGHBORS?: MORE THAN THREE TIMES A WEEK
DO YOU BELONG TO ANY CLUBS OR ORGANIZATIONS SUCH AS CHURCH GROUPS UNIONS, FRATERNAL OR ATHLETIC GROUPS, OR SCHOOL GROUPS?: YES
HOW OFTEN DO YOU GET TOGETHER WITH FRIENDS OR RELATIVES?: ONCE A WEEK

## 2024-01-11 ASSESSMENT — ACTIVITIES OF DAILY LIVING (ADL): CURRENT_FUNCTION: NO ASSISTANCE NEEDED

## 2024-01-11 ASSESSMENT — LIFESTYLE VARIABLES: HOW OFTEN DO YOU HAVE A DRINK CONTAINING ALCOHOL: NEVER

## 2024-01-11 NOTE — PROGRESS NOTES
"SUBJECTIVE:   Chandra is a 68 year old, presenting for the following:  Medicare Visit        1/11/2024     7:42 AM   Additional Questions   Roomed by Waldemar OH.       Are you in the first 12 months of your Medicare coverage?  Yes,  Visual Acuity:  Right Eye: 10/16   Left Eye: 10/20  Both Eyes: 10/16    Healthy Habits:     In general, how would you rate your overall health?  Good    Frequency of exercise:  1 day/week    Duration of exercise:  Less than 15 minutes    Do you usually eat at least 4 servings of fruit and vegetables a day, include whole grains    & fiber and avoid regularly eating high fat or \"junk\" foods?  No    Taking medications regularly:  Yes    Medication side effects:  Muscle aches    Ability to successfully perform activities of daily living:  No assistance needed    Home Safety:  No safety concerns identified    Hearing Impairment:  Difficulty following a conversation in a noisy restaurant or crowded room, difficulty understanding soft or whispered speech and no hearing concerns    In the past 6 months, have you been bothered by leaking of urine?  No    In general, how would you rate your overall mental or emotional health?  Excellent    Additional concerns today:  No      Today's PHQ-2 Score:       1/11/2024     7:38 AM   PHQ-2 ( 1999 [employer])   Q1: Little interest or pleasure in doing things 0   Q2: Feeling down, depressed or hopeless 0   PHQ-2 Score 0   Q1: Little interest or pleasure in doing things Not at all   Q2: Feeling down, depressed or hopeless Not at all   PHQ-2 Score 0           Have you ever done Advance Care Planning? (For example, a Health Directive, POLST, or a discussion with a medical provider or your loved ones about your wishes): Yes, patient states has an Advance Care Planning document and will bring a copy to the clinic.       Fall risk  Fallen 2 or more times in the past year?: No  Any fall with injury in the past year?: No    Cognitive Screening   1) Repeat 3 items (Leader, " Season, Table)    2) Clock draw: NORMAL  3) 3 item recall: Recalls 2 objects   Results: NORMAL clock, 1-2 items recalled: COGNITIVE IMPAIRMENT LESS LIKELY    Mini-CogTM Copyright LORENZO Gambino. Licensed by the author for use in [facility]; reprinted with permission (javan@[facility]). All rights reserved.      Do you have sleep apnea, excessive snoring or daytime drowsiness? : no    Reviewed and updated as needed this visit by clinical staff   Tobacco  Allergies  Meds              Reviewed and updated as needed this visit by Provider                 Social History     Tobacco Use    Smoking status: Never    Smokeless tobacco: Never   Substance Use Topics    Alcohol use: No             1/11/2024     7:37 AM   Alcohol Use   Prescreen: >3 drinks/day or >7 drinks/week? No     Do you have a current opioid prescription? No  Do you use any other controlled substances or medications that are not prescribed by a provider? None          Hyperlipidemia Follow-Up    Are you regularly taking any medication or supplement to lower your cholesterol?   Yes- rosuvastatin.  Are you having muscle aches or other side effects that you think could be caused by your cholesterol lowering medication?  No    Current providers sharing in care for this patient include:   Patient Care Team:  Jeison Zhu MD as PCP - General  Jeison Zhu MD as Assigned PCP    The following health maintenance items are reviewed in Epic and correct as of today:  Health Maintenance   Topic Date Due    RSV VACCINE (Pregnancy & 60+) (1 - 1-dose 60+ series) Never done    ANNUAL REVIEW OF HM ORDERS  12/30/2023    COVID-19 Vaccine (7 - 2023-24 season) 01/03/2024    MEDICARE ANNUAL WELLNESS VISIT  12/30/2023    FALL RISK ASSESSMENT  01/11/2025    COLORECTAL CANCER SCREENING  10/04/2026    ADVANCE CARE PLANNING  12/30/2027    DTAP/TDAP/TD IMMUNIZATION (3 - Td or Tdap) 09/04/2028    LIPID  01/04/2029    HEPATITIS C SCREENING  Completed    PHQ-2 (once per calendar  year)  Completed    INFLUENZA VACCINE  Completed    Pneumococcal Vaccine: 65+ Years  Completed    ZOSTER IMMUNIZATION  Completed    IPV IMMUNIZATION  Aged Out    HPV IMMUNIZATION  Aged Out    MENINGITIS IMMUNIZATION  Aged Out    RSV MONOCLONAL ANTIBODY  Aged Out    AORTIC ANEURYSM SCREENING (SYSTEM ASSIGNED)  Discontinued     BP Readings from Last 3 Encounters:   01/11/24 130/87   12/30/22 120/78   06/27/19 116/70    Wt Readings from Last 3 Encounters:   01/11/24 94.6 kg (208 lb 9.6 oz)   12/30/22 94.4 kg (208 lb 3.2 oz)   06/27/19 72.1 kg (159 lb)                  Patient Active Problem List   Diagnosis    Hyperlipidemia LDL goal <160    Psoriatic arthritis (H)    Class 1 obesity due to excess calories with serious comorbidity and body mass index (BMI) of 31.0 to 31.9 in adult    Umbilical hernia    Other psoriasis    Chronic left-sided low back pain with left-sided sciatica     Past Surgical History:   Procedure Laterality Date    COLONOSCOPY N/A 10/4/2016    Procedure: COLONOSCOPY;  Surgeon: Timmy Roepr MD;  Location:  GI    ORTHOPEDIC SURGERY         Social History     Tobacco Use    Smoking status: Never    Smokeless tobacco: Never   Substance Use Topics    Alcohol use: No     Family History   Problem Relation Age of Onset    Lung Cancer Mother     Osteoporosis Mother     Prostate Cancer Brother     Hypertension Brother     Diabetes Sister     Coronary Artery Disease Brother     Prostate Cancer Brother          Current Outpatient Medications   Medication Sig Dispense Refill    Calcitriol 3 MCG/GM OINT APPLY 1 GM TO AFFECTED AREA(S) AS NEEDED 100 g 1    clobetasol (TEMOVATE) 0.05 % external ointment Apply to affected skin  twice weekly prn.  45 grams to last 90 days 45 g 3    folic acid (FOLVITE) 1 MG tablet       methotrexate (XATMEP) 2.5 MG/ML oral solution       rosuvastatin (CRESTOR) 20 MG tablet Take 1 tablet (20 mg) by mouth daily 90 tablet 3    inFLIXimab (REMICADE) 100 MG injection Inject 5  "mg/kg into the vein once               Review of Systems   Constitutional:  Negative for chills and fever.   HENT:  Negative for congestion, ear pain, hearing loss and sore throat.    Eyes:  Negative for pain and visual disturbance.   Respiratory:  Negative for cough and shortness of breath.    Cardiovascular:  Negative for chest pain, palpitations and peripheral edema.   Gastrointestinal:  Negative for abdominal pain, constipation, diarrhea, heartburn, hematochezia and nausea.   Genitourinary:  Negative for dysuria, frequency, genital sores, hematuria, impotence, penile discharge and urgency.   Musculoskeletal:  Positive for arthralgias, joint swelling and myalgias.   Skin:  Negative for rash.   Neurological:  Negative for dizziness, weakness, headaches and paresthesias.   Psychiatric/Behavioral:  Negative for mood changes. The patient is not nervous/anxious.          OBJECTIVE:   /87 (BP Location: Right arm, Patient Position: Chair, Cuff Size: Adult Large)   Pulse 88   Temp 98.1  F (36.7  C) (Oral)   Resp 14   Ht 1.702 m (5' 7\")   Wt 94.6 kg (208 lb 9.6 oz)   SpO2 96%   BMI 32.67 kg/m   Estimated body mass index is 32.67 kg/m  as calculated from the following:    Height as of this encounter: 1.702 m (5' 7\").    Weight as of this encounter: 94.6 kg (208 lb 9.6 oz).  Physical Exam  GENERAL: healthy, alert and no distress  EYES: Eyes grossly normal to inspection, PERRL and conjunctivae and sclerae normal  HENT: ear canals and TM's normal, nose and mouth without ulcers or lesions  NECK: no adenopathy, no asymmetry, masses, or scars and thyroid normal to palpation  RESP: lungs clear to auscultation - no rales, rhonchi or wheezes  CV: regular rate and rhythm, normal S1 S2, no S3 or S4, no murmur, click or rub, no peripheral edema and peripheral pulses strong  ABDOMEN: soft, nontender, no hepatosplenomegaly, no masses and bowel sounds normal  RECTAL: normal sphincter tone, no rectal masses, prostate normal " "size, smooth, nontender without nodules or masses  MS: no gross musculoskeletal defects noted, no edema  SKIN: no suspicious lesions or rashes  NEURO: Normal strength and tone, mentation intact and speech normal  PSYCH: mentation appears normal, affect normal/bright        ASSESSMENT / PLAN:   (Z00.00) Encounter for Medicare annual wellness exam  (primary encounter diagnosis)  Comment: Today I counseled the patient about diet, regular exercise and weight loss planning.  Plan:     (Z29.11) Need for vaccination against respiratory syncytial virus  Comment: pt to get it from the pharmacy.  Plan:     (L40.50) Psoriatic arthritis (H)  Comment: follows up with Rheumatology  Plan: started on Infliximib, and continue on methotrexate.     (E78.5) Hyperlipidemia LDL goal <160  Comment: controlled, continue on   Plan: rosuvastatin (CRESTOR) 20 MG tablet            (Z80.42) Family hx of prostate cancer  Comment: 2 brother with prostate cancer.   Plan: check PSA and MAREK yearly.          COUNSELING:  Reviewed preventive health counseling, as reflected in patient instructions       Regular exercise       Healthy diet/nutrition      BMI:   Estimated body mass index is 32.67 kg/m  as calculated from the following:    Height as of this encounter: 1.702 m (5' 7\").    Weight as of this encounter: 94.6 kg (208 lb 9.6 oz).   Weight management plan: Discussed healthy diet and exercise guidelines      He reports that he has never smoked. He has never used smokeless tobacco.      Appropriate preventive services were discussed with this patient, including applicable screening as appropriate for fall prevention, nutrition, physical activity, Tobacco-use cessation, weight loss and cognition.  Checklist reviewing preventive services available has been given to the patient.    Reviewed patients plan of care and provided an AVS. The Intermediate Care Plan ( asthma action plan, low back pain action plan, and migraine action plan) for Artie meets " the Care Plan requirement. This Care Plan has been established and reviewed with the Patient.        Jeison Zhu MD  [facility]    Identified Health Risks:  He is at risk for lack of exercise and has been provided with information to increase physical activity for the benefit of his well-being.  The patient was counseled and encouraged to consider modifying their diet and eating habits. He was provided with information on recommended healthy diet options.  The patient was provided with written information regarding signs of hearing loss.

## 2024-01-11 NOTE — PATIENT INSTRUCTIONS
"Patient Education   Personalized Prevention Plan  You are due for the preventive services outlined below.  Your care team is available to assist you in scheduling these services.  If you have already completed any of these items, please share that information with your care team to update in your medical record.  Health Maintenance Due   Topic Date Due     RSV VACCINE (Pregnancy & 60+) (1 - 1-dose 60+ series) Never done     ANNUAL REVIEW OF HM ORDERS  12/30/2023     COVID-19 Vaccine (7 - 2023-24 season) 01/03/2024     Learning About Being Physically Active  What is physical activity?     Being physically active means doing any kind of activity that gets your body moving.  The types of physical activity that can help you get fit and stay healthy include:  Aerobic or \"cardio\" activities. These make your heart beat faster and make you breathe harder, such as brisk walking, riding a bike, or running. They strengthen your heart and lungs and build up your endurance.  Strength training activities. These make your muscles work against, or \"resist,\" something. Examples include lifting weights or doing push-ups. These activities help tone and strengthen your muscles and bones.  Stretches. These let you move your joints and muscles through their full range of motion. Stretching helps you be more flexible.  Reaching a balance between these three types of physical activity is important because each one contributes to your overall fitness.  What are the benefits of being active?  Being active is one of the best things you can do for your health. It helps you to:  Feel stronger and have more energy to do all the things you like to do.  Focus better at school or work.  Feel, think, and sleep better.  Reach and stay at a healthy weight.  Lose fat and build lean muscle.  Lower your risk for serious health problems, including diabetes, heart attack, high blood pressure, and some cancers.  Keep your heart, lungs, bones, muscles, and " "joints strong and healthy.  How can you make being active part of your life?  Start slowly. Make it your long-term goal to get at least 30 minutes of exercise on most days of the week. Walking is a good choice. You also may want to do other activities, such as running, swimming, cycling, or playing tennis or team sports.  Pick activities that you like--ones that make your heart beat faster, your muscles stronger, and your muscles and joints more flexible. If you find more than one thing you like doing, do them all. You don't have to do the same thing every day.  Get your heart pumping every day. Any activity that makes your heart beat faster and keeps it at that rate for a while counts.  Here are some great ways to get your heart beating faster:  Go for a brisk walk, run, or hike.  Go for a swim or bike ride.  Take an online exercise class or dance.  Play a game of touch football, basketball, or soccer.  Play tennis, pickleball, or racquetball.  Climb stairs.  Even some household chores can be aerobic. Just do them at a faster pace. Raking or mowing the lawn, sweeping the garage, and vacuuming and cleaning your home all can help get your heart rate up.  Strengthen your muscles during the week. You don't have to lift heavy weights or grow big, bulky muscles to get stronger. Doing a few simple activities that make your muscles work against, or \"resist,\" something can help you get stronger. Aim for at least twice a week.  For example, you can:  Do push-ups or sit-ups, which use your own body weight as resistance.  Lift weights or dumbbells or use stretch bands at home or in a gym or community center.  Stretch your muscles often. Stretching will help you as you become more active. It can help you stay flexible and loosen tight muscles. It can also help improve your balance and posture and can be a great way to relax.  Be sure to stretch the muscles you'll be using when you work out. It's best to warm your muscles " "slightly before you stretch them. Walk or do some other light aerobic activity for a few minutes. Then start stretching.  When you stretch your muscles:  Do it slowly. Stretching is not about going fast or making sudden movements.  Don't push or bounce during a stretch.  Hold each stretch for at least 15 to 30 seconds, if you can. You should feel a stretch in the muscle, but not pain.  Breathe out as you do the stretch. Then breathe in as you hold the stretch. Don't hold your breath.  If you're worried about how more activity might affect your health, have a checkup before you start. Follow any special advice your doctor gives you for getting a smart start.  Where can you learn more?  Go to https://www.[employer].net/patiented  Enter W332 in the search box to learn more about \"Learning About Being Physically Active.\"  Current as of: June 6, 2023               Content Version: 13.8    7471-0155 [email or web address].   Care instructions adapted under license by your healthcare professional. If you have questions about a medical condition or this instruction, always ask your healthcare professional. [email or web address] disclaims any warranty or liability for your use of this information.      Learning About Dietary Guidelines  What are the Dietary Guidelines for Americans?     Dietary Guidelines for Americans provide tips for eating well and staying healthy. This helps reduce the risk for long-term (chronic) diseases.  These guidelines recommend that you:  Eat and drink the right amount for you. The [place] government's food guide is called MyPlate. It can help you make your own well-balanced eating plan.  Try to balance your eating with your activity. This helps you stay at a healthy weight.  Drink alcohol in moderation, if at all.  Limit foods high in salt, saturated fat, trans fat, and added sugar.  These guidelines are from the [place] Department of Agriculture and the U[place] Department of Health and Human " "Services. They are updated every 5 years.  What is MyPlate?  MyPlate is the [place] government's food guide. It can help you make your own well-balanced eating plan. A balanced eating plan means that you eat enough, but not too much, and that your food gives you the nutrients you need to stay healthy.  MyPlate focuses on eating plenty of whole grains, fruits, and vegetables, and on limiting fat and sugar. It is available online at www.ChooseMyPlate.gov.  How can you get started?  If you're trying to eat healthier, you can slowly change your eating habits over time. You don't have to make big changes all at once. Start by adding one or two healthy foods to your meals each day.  Grains  Choose whole-grain breads and cereals and whole-wheat pasta and whole-grain crackers.  Vegetables  Eat a variety of vegetables every day. They have lots of nutrients and are part of a heart-healthy diet.  Fruits  Eat a variety of fruits every day. Fruits contain lots of nutrients. Choose fresh fruit instead of fruit juice.  Protein foods  Choose fish and lean poultry more often. Eat red meat and fried meats less often. Dried beans, tofu, and nuts are also good sources of protein.  Dairy  Choose low-fat or fat-free products from this food group. If you have problems digesting milk, try eating cheese or yogurt instead.  Fats and oils  Limit fats and oils if you're trying to cut calories. Choose healthy fats when you cook. These include canola oil and olive oil.  Where can you learn more?  Go to https://www.[employer].net/patiented  Enter D676 in the search box to learn more about \"Learning About Dietary Guidelines.\"  Current as of: February 28, 2023               Content Version: 13.8    3559-3557 [employer].   Care instructions adapted under license by your healthcare professional. If you have questions about a medical condition or this instruction, always ask your healthcare professional. [employer], [employer] disclaims " any warranty or liability for your use of this information.      Hearing Loss: Care Instructions  Overview     Hearing loss is a sudden or slow decrease in how well you hear. It can range from slight to profound. Permanent hearing loss can occur with aging. It also can happen when you are exposed long-term to loud noise. Examples include listening to loud music, riding motorcycles, or being around other loud machines.  Hearing loss can affect your work and home life. It can make you feel lonely or depressed. You may feel that you have lost your independence. But hearing aids and other devices can help you hear better and feel connected to others.  Follow-up care is a key part of your treatment and safety. Be sure to make and go to all appointments, and call your doctor if you are having problems. It's also a good idea to know your test results and keep a list of the medicines you take.  How can you care for yourself at home?  Avoid loud noises whenever possible. This helps keep your hearing from getting worse.  Always wear hearing protection around loud noises.  Wear a hearing aid as directed.  A professional can help you pick a hearing aid that will work best for you.  You can also get hearing aids over the counter for mild to moderate hearing loss.  Have hearing tests as your doctor suggests. They can show whether your hearing has changed. Your hearing aid may need to be adjusted.  Use other devices as needed. These may include:  Telephone amplifiers and hearing aids that can connect to a television, stereo, radio, or microphone.  Devices that use lights or vibrations. These alert you to the doorbell, a ringing telephone, or a baby monitor.  Television closed-captioning. This shows the words at the bottom of the screen. Most new TVs can do this.  TTY (text telephone). This lets you type messages back and forth on the telephone instead of talking or listening. These devices are also called TDD. When messages are  "typed on the keyboard, they are sent over the phone line to a receiving TTY. The message is shown on a monitor.  Use text messaging, social media, and email if it is hard for you to communicate by telephone.  Try to learn a listening technique called speechreading. It is not lipreading. You pay attention to people's gestures, expressions, posture, and tone of voice. These clues can help you understand what a person is saying. Face the person you are talking to, and have them face you. Make sure the lighting is good. You need to see the other person's face clearly.  Think about counseling if you need help to adjust to your hearing loss.  When should you call for help?  Watch closely for changes in your health, and be sure to contact your doctor if:    You think your hearing is getting worse.     You have new symptoms, such as dizziness or nausea.   Where can you learn more?  Go to https://www.[employer].net/patiented  Enter R798 in the search box to learn more about \"Hearing Loss: Care Instructions.\"  Current as of: February 28, 2023               Content Version: 13.8    2408-8239 [employer].   Care instructions adapted under license by your healthcare professional. If you have questions about a medical condition or this instruction, always ask your healthcare professional. [employer] disclaims any warranty or liability for your use of this information.         "

## 2024-02-14 ENCOUNTER — TRANSFERRED RECORDS (OUTPATIENT)
Dept: HEALTH INFORMATION MANAGEMENT | Facility: CLINIC | Age: 69
End: 2024-02-14
Payer: MEDICARE

## 2024-02-14 LAB
ALT SERPL-CCNC: 25 IU/L (ref 5–40)
AST SERPL-CCNC: 25 U/L (ref 5–34)
CREATININE (EXTERNAL): 1.01 MG/DL (ref 0.5–1.3)

## 2024-05-21 ENCOUNTER — TRANSFERRED RECORDS (OUTPATIENT)
Dept: HEALTH INFORMATION MANAGEMENT | Facility: CLINIC | Age: 69
End: 2024-05-21
Payer: MEDICARE

## 2024-05-21 LAB
ALT SERPL-CCNC: 26 IU/L (ref 5–40)
AST SERPL-CCNC: 24 U/L (ref 5–34)
CREATININE (EXTERNAL): 0.9 MG/DL (ref 0.5–1.3)

## 2024-08-20 ENCOUNTER — TRANSFERRED RECORDS (OUTPATIENT)
Dept: FAMILY MEDICINE | Facility: CLINIC | Age: 69
End: 2024-08-20
Payer: MEDICARE

## 2024-08-20 LAB
ALT SERPL-CCNC: 26 IU/L (ref 9–46)
AST SERPL-CCNC: 23 U/L (ref 10–40)
CHOLESTEROL (EXTERNAL): 176 MG/DL (ref 4–200)
CREATININE (EXTERNAL): 0.9 MG/DL (ref 0.6–1.35)
HDLC SERPL-MCNC: 50 MG/DL (ref 41–9999)
LDL CHOLESTEROL CALCULATED (EXTERNAL): 97.5 MG/DL (ref 0–100)
TRIGLYCERIDES (EXTERNAL): 140 MG/DL (ref 0–150)

## 2024-12-04 ENCOUNTER — TRANSFERRED RECORDS (OUTPATIENT)
Dept: HEALTH INFORMATION MANAGEMENT | Facility: CLINIC | Age: 69
End: 2024-12-04
Payer: MEDICARE

## 2024-12-04 LAB
ALT SERPL-CCNC: 20 IU/L (ref 9–46)
AST SERPL-CCNC: 22 U/L (ref 10–40)
CREATININE (EXTERNAL): 0.9 MG/DL (ref 0.6–1.35)

## 2025-01-21 ENCOUNTER — PATIENT OUTREACH (OUTPATIENT)
Dept: FAMILY MEDICINE | Facility: CLINIC | Age: 70
End: 2025-01-21
Payer: MEDICARE

## 2025-01-21 NOTE — TELEPHONE ENCOUNTER
Patient Quality Outreach    Patient is due for the following:   Physical Preventive Adult Physical    Action(s) Taken:   Patient has upcoming appointment, these items will be addressed at that time.    Type of outreach:    Chart review performed, no outreach needed.    Questions for provider review:    None           Maddy Patiño, GILLES

## 2025-01-26 ENCOUNTER — HEALTH MAINTENANCE LETTER (OUTPATIENT)
Age: 70
End: 2025-01-26

## 2025-03-07 PROBLEM — M17.0 PRIMARY OSTEOARTHRITIS OF BOTH KNEES: Status: ACTIVE | Noted: 2025-03-07

## 2025-05-29 ENCOUNTER — TRANSFERRED RECORDS (OUTPATIENT)
Dept: HEALTH INFORMATION MANAGEMENT | Facility: CLINIC | Age: 70
End: 2025-05-29
Payer: MEDICARE